# Patient Record
Sex: MALE | Race: WHITE | Employment: FULL TIME | URBAN - METROPOLITAN AREA
[De-identification: names, ages, dates, MRNs, and addresses within clinical notes are randomized per-mention and may not be internally consistent; named-entity substitution may affect disease eponyms.]

---

## 2022-10-26 ENCOUNTER — HOSPITAL ENCOUNTER (OUTPATIENT)
Dept: GENERAL RADIOLOGY | Age: 52
Discharge: HOME | End: 2022-10-26

## 2022-10-26 ENCOUNTER — HOSPITAL ENCOUNTER (OUTPATIENT)
Age: 52
Discharge: HOME | End: 2022-10-26

## 2022-10-26 DIAGNOSIS — R52 PAIN: ICD-10-CM

## 2022-11-10 ENCOUNTER — HOSPITAL ENCOUNTER (OUTPATIENT)
Dept: OCCUPATIONAL THERAPY | Age: 52
Setting detail: THERAPIES SERIES
Discharge: HOME | End: 2022-11-10
Payer: COMMERCIAL

## 2022-11-10 PROCEDURE — 97110 THERAPEUTIC EXERCISES: CPT

## 2022-11-10 PROCEDURE — 97165 OT EVAL LOW COMPLEX 30 MIN: CPT

## 2022-11-10 NOTE — PROGRESS NOTES
Occupational Therapy  ** PLEASE SIGN, DATE AND TIME CERTIFICATION BELOW AND RETURN TO [facility] OUTPATIENT REHABILITATION (FAX #: [phone]). ATTEST/CO-SIGN IF ACCESSING VIA INMiNOW[employer]less. THANK YOU.**    I certify that I have examined the patient below and determined that Physical Medicine and Rehabilitation service is necessary and that I approve the established plan of care for up to 90 days or as specifically noted. Attestation, signature or co-signature of physician indicates approval of certification requirements.    ________________________ ____________ __________  Physician Signature   Date   Time   [address]  [x] EVALUATION  [] DAILY NOTE (LAND) [] DAILY NOTE (AQUATIC ) [] PROGRESS NOTE [] DISCHARGE NOTE    [x] [address] [facility]   [] [facility] 90    [] [facility]   [] Polina Lal    Date: 11/10/2022  Patient Name:  Antoine Poole  : 1970  MRN: [id number]  CSN: [id number]    Referring Practitioner SARAH Gipson - *   Diagnosis Strain of unspecified muscle, fascia and tendon at shoulder and upper arm level, left arm, initial encounter [W92.952W]    Treatment Diagnosis L Shoulder Strain, L shoulder Pain, L shoulder weakness, L shoulder stiffness   Date of Evaluation 11/10/22      Functional Outcome Measure Used UEFI   Functional Outcome Score 43 (11/10/22)       Insurance: Primary: Payor: Javier BLOOM /  /  / ,   Secondary:    Authorization Information: PRE CERTIFICATION REQUIRED: YES, APPROVED FOR PT/OT, 1-3 TIMES A WEEK FOR 12 VISITS, FROM 22 TO 22  UNDER PRESUMPTIVE AUTHORIZATION.    IF CLAIM IS NOT APPROVED, PATIENT WILL BE RESPONSIBLE PARTY FOR PAYMENT    INSURANCE THERAPY BENEFIT:     AQUATIC THERAPY COVERED: NO  MODALITIES COVERED:  YES, NO IONT    Visit # 1, 1/10 for progress note   Visits Allowed: 12   Recertification Date: 68   Physician Follow-Up: 2022 Physician Orders: 1-3x per week x12 visits, evaluate and treat   Pertinent History: Pt is 46 y/o M who reports he injured his L shoulder at work on 10/20/22 picking up a heavy box and he reports he felt a \"pop\". Pt reports he kept working and figured it would get better, but the pain persisted so he later went to occupational health who took x-rays which revealed no acute osseous abnormalities. Pt reports they placed him on a 10 lb lifting restriction at work and referred pt to OT for eval and tx for L shoulder strain. Pt reports he had a follow up this morning at [facility] and they plan to schedule an MRI after they see how therapy goes for a few weeks. Pt reported PMH: High Blood Pressure     X-ray Report 10/26/22: \"There is no fracture or dislocation. Glenohumeral joint space is preserved. Mild degenerative changes of the acromioclavicular joint and sclerotic degenerative change at the tuberosity. \"    SUBJECTIVE: Patient is pleasant and cooperative. Social/Functional History:  Medications and Allergies have been reviewed and are listed on the [address] lives with family  Task Prior Level of Function  (current level of function addressed below)   ADLs  Independent   Ambulation Independent   Transfers Independent   Hobbies Gardening, fishing   Driving Active    Work [email or web address] Occupation: Ollies, unoad trucks, stock shelves, carrying carpet     OBJECTIVE:  Hand Dominance left handed   Palpation No increased tenderness to palpation   Observation    Posture Forward, sloping shoulder posture   Edema    Special Tests        ADL's Pt reports some difficulty with dressing, bathing with L UE, is not lifting laundry baskets or cleaning overhead at home. Pt is limited in work tasks to 10 lb lifting restriction. Bed Mobility     Transfers    Balance        Sensation Left Upper Extremity: Intact.    Coordination WNL           LEFT UPPER EXTREMITY  RANGE OF MOTION AROM PROM COMMENTS         Shoulder Flexion 89 105 Pain denoted   Shoulder Extension 39     Shoulder Abduction 83 80    Shoulder Adduction      Shoulder External Rotation 28 35 Pain with PROM   Shoulder Internal Rotation Thumb to hip     Should[facility]  []      Elbow Flexion      Elbow Extension      Forearm Pronation      Forearm Supination      Elbow Range of Motion is [facility]  [x]      Wrist Flexion      Wrist Extension      Wrist Radial Deviation      Wrist Ulnar Deviation      Wrist Range of Motion is [facility]  [x]   If no measurement is recorded, no formal assessment was completed for that motion. Strength not assessed this date due to significant pain and limitations in ROM; TBA in the future when appropriate    TREATMENT   Precautions: general precautions    Pain: 4-5/10 aching pain at rest, 8-9/10 with ROM     X in shaded column indicates Activity Completed Today   Modalities Parameters/  Location  Notes/Comments                     Manual Therapy Time/  Technique  Notes/Comments                     Exercises   Sets/  Sec Reps  Notes/Comments   Supine dowel chest press AAROM 1 5 X    Supine crossbody stretch  10 sec 3 X    Supine dowel ER 10 sec 3 X    Table slides for flexion and abduction 1 10 X    Scap pinches   X                  Activities Time    Notes/Comments   Kinesiotape- Two I-strips applied for correction of forward sloping shoulder posture  X                  Specific Interventions Next Treatment: MHP, STM, joint mobs, ROM    Activity/Treatment Tolerance:  []  Patient tolerated treatment well  []  Patient limited by fatigue  [x]  Patient limited by pain   []  Patient limited by other medical complications  []  Other:     Assessment: Patient referred to therapy for L shoulder strain. Pt demonstrates increased shoulder pain, decreased ROM, and decreased strength which impairs his ability to complete ADL, IADL, and work tasks independently and without difficulty.  Pt would benefit from skilled occupational therapy services 2x per week for ~8 weeks to address these deficits and assist patient with return to work without restrictions. Areas for Improvement: impaired ROM, impaired strength, and pain  Prognosis: good    GOALS:  Patient Goal: decrease pain, increase ROM of shoulder, return to work without restrictions    Short Term Goals:  Time Frame: 4 weeks  * Patient will increase L shoulder flexion to 110, abduction to 100, IR to 1 inch below waistline, ER to 45 for increased independence with dressing tasks. *Patient will report pain with activity no higher than 3/10 to increase ease and independence with ADL and IADL tasks. *Patient will demonstrate independence with HEP in order to increase flexibility and ability to reach overhead for IADL and work tasks. Long Term Goals:  Time Frame: 8 weeks  *  Patient will report sleeping on the L side without waking due to pain. *  Pt. will be able to don shirt and wash back using L UE without pain in shoulder  *  Patient will improve UEFS to at least 60    Patient Education:   [x]  HEP/Education Completed: Plan of Care, Goals, HEP  [facility] Access Code for HEP:   Crossbody stretch, Supine dowel ER, Supine dowel chest press, scap pinches, table slides for flexion and abduction  []  No new Education completed  []  Reviewed Prior HEP      [x]  Patient verbalized and/or demonstrated understanding of education provided. []  Patient unable to verbalize and/or demonstrate understanding of education provided. Will continue education. [x]  Barriers to learning: none    PLAN:  Treatment Recommendations: Strengthening, Range of Motion, Manual Therapy - Soft Tissue Mobilization, Manual Therapy - Joint Manipulation, Pain Management, Home Exercise Program, Patient Education, Safety Education and Training, and Self-Care Education and Training    [x]  Plan of care initiated.   Plan to see patient 2 times per week for 8 weeks to address the treatment planned outlined above.   []  Continue with current plan of care  []  Modify plan of care as follows:    []  Hold pending physician visit  []  Discharge    Time In 1601   Time Out 1644   Timed Code Minutes: 15 min   Total Treatment Time: 43 min     CPT CODE TIME-IN TIME-OUT TOTAL TIME   Low Comp Eval-[id number] 1601 1629 28 min   There Ex 1629 1644 15 min               TOTAL SESSION   43 min           Electronically Signed by: Anne Maddox, [facility], OTR/L #[id number]

## 2022-11-15 ENCOUNTER — HOSPITAL ENCOUNTER (OUTPATIENT)
Dept: OCCUPATIONAL THERAPY | Age: 52
Setting detail: THERAPIES SERIES
Discharge: HOME | End: 2022-11-15
Payer: COMMERCIAL

## 2022-11-15 PROCEDURE — 97110 THERAPEUTIC EXERCISES: CPT

## 2022-11-15 PROCEDURE — 97140 MANUAL THERAPY 1/> REGIONS: CPT

## 2022-11-15 NOTE — PROGRESS NOTES
[address]  [] EVALUATION  [x] DAILY NOTE (LAND) [] DAILY NOTE (AQUATIC ) [] PROGRESS NOTE [] DISCHARGE NOTE    [x] OUTPATIENT REHABILITATION [facility]   [] [place] [address]    [] [facility]   [] Lluvia Gallegos    Date: 11/15/2022  Patient Name:  Jose M Zamudio  : 1970  MRN: [id number]  CSN: [id number]    Referring Practitioner SARAH Wick - *   Diagnosis Strain of unspecified muscle, fascia and tendon at shoulder and upper arm level, left arm, initial encounter [P05.256O]    Treatment Diagnosis L Shoulder Strain, L shoulder Pain, L shoulder weakness, L shoulder stiffness   Date of Evaluation 11/10/22      Functional Outcome Measure Used UEFI   Functional Outcome Score 43 (11/10/22)       Insurance: Primary: Payor: Emy Osman SHEILA /  /  / ,   Secondary:    Authorization Information: PRE CERTIFICATION REQUIRED: YES, APPROVED FOR PT/OT, 1-3 TIMES A WEEK FOR 12 VISITS, FROM 22 TO 22  UNDER PRESUMPTIVE AUTHORIZATION. IF CLAIM IS NOT APPROVED, PATIENT WILL BE RESPONSIBLE PARTY FOR PAYMENT    INSURANCE THERAPY BENEFIT:     AQUATIC THERAPY COVERED: NO  MODALITIES COVERED:  YES, NO IONT    Visit # 2, 2/10 for progress note   Visits Allowed: 12   Recertification Date: 3/7/90   Physician Follow-Up: 2022   Physician Orders: 1-3x per week x12 visits, evaluate and treat   Pertinent History: Pt is 46 y/o M who reports he injured his L shoulder at work on 10/20/22 picking up a heavy box and he reports he felt a \"pop\". Pt reports he kept working and figured it would get better, but the pain persisted so he later went to occupational health who took x-rays which revealed no acute osseous abnormalities. Pt reports they placed him on a 10 lb lifting restriction at work and referred pt to OT for eval and tx for L shoulder strain.  Pt reports he had a follow up this morning at [facility] and they plan to schedule an MRI after they see how therapy goes for a few weeks. Pt reported PMH: High Blood Pressure     X-ray Report 10/26/22: \"There is no fracture or dislocation. Glenohumeral joint space is preserved. Mild degenerative changes of the acromioclavicular joint and sclerotic degenerative change at the tuberosity. \"    SUBJECTIVE: Patient reports continued soreness but compliance with ex's. Pt reports some relief with the tape applied at last visit. Pt reports he tweaked his shoulder while using a pallet james at work on Saturday.     TREATMENT   Precautions: general precautions    Pain: 7/10     X in shaded column indicates Activity Completed Today   Modalities Parameters/  Location  Notes/Comments   MHP with dowel ER, upper trap stretch, table slides  X                Manual Therapy Time/  Technique  Notes/Comments   Manual STM to L upper trap and posterior shoulder  X                Exercises   Sets/  Sec Reps  Notes/Comments   Supine dowel chest press AAROM, flexion 1 10 X Discomfort with chest press, tolerated adding flexion well this date   Supine crossbody stretch  15 sec 5 X Completed manually by therapist this date   Supine dowel ER 10 sec 5 X    Table slides for flexion and abduction 1 10 X Flexion only this date with MHP   Scap pinches 1 10 X    Pulleys for shoulder flexion 1 3 min X    Doorway stretch for biceps/pec minor 15 sec 3 X           Activities Time    Notes/Comments   Kinesiotape- Two I-strips applied for correction of forward sloping shoulder posture  X Re-applied as pt reports relief with tape                 Specific Interventions Next Treatment: MHP, STM, joint mobs, ROM    Activity/Treatment Tolerance:  []  Patient tolerated treatment well  []  Patient limited by fatigue  [x]  Patient limited by pain   []  Patient limited by other medical complications  []  Other:     Assessment: Patient tolerated therapy well this date with noted improvements in shoulder flexion AAROM, pain persists but pt is progressing toward goals. Areas for Improvement: impaired ROM, impaired strength, and pain  Prognosis: good    GOALS:  Patient Goal: decrease pain, increase ROM of shoulder, return to work without restrictions    Short Term Goals:  Time Frame: 4 weeks  * Patient will increase L shoulder flexion to 110, abduction to 100, IR to 1 inch below waistline, ER to 45 for increased independence with dressing tasks. *Patient will report pain with activity no higher than 3/10 to increase ease and independence with ADL and IADL tasks. *Patient will demonstrate independence with HEP in order to increase flexibility and ability to reach overhead for IADL and work tasks. Long Term Goals:  Time Frame: 8 weeks  *  Patient will report sleeping on the L side without waking due to pain. *  Pt. will be able to don shirt and wash back using L UE without pain in shoulder  *  Patient will improve UEFS to at least 60    Patient Education:   [x]  HEP/Education Completed: Plan of Care, Goals, HEP  [employer] Access Code for HEP:   Crossbody stretch, Supine dowel ER, Supine dowel chest press, scap pinches, table slides for flexion and abduction  Shoulder flexion dowel AAROM in supine, doorway stretch for biceps/pec minor  []  No new Education completed  []  Reviewed Prior HEP      [x]  Patient verbalized and/or demonstrated understanding of education provided. []  Patient unable to verbalize and/or demonstrate understanding of education provided. Will continue education. [x]  Barriers to learning: none    PLAN:  Treatment Recommendations: Strengthening, Range of Motion, Manual Therapy - Soft Tissue Mobilization, Manual Therapy - Joint Manipulation, Pain Management, Home Exercise Program, Patient Education, Safety Education and Training, and Self-Care Education and Training    []  Plan of care initiated. Plan to see patient 2 times per week for 8 weeks to address the treatment planned outlined above.   [x]  Continue with current plan of care  []  Modify plan of care as follows:    []  Hold pending physician visit  []  Discharge    Time In 0935   Time Out 1015   Timed Code Minutes: 40 min   Total Treatment Time: 40 min     CPT CODE TIME-IN TIME-OUT TOTAL TIME   Manual-85362 1000 1015 15 min   Ther Ex-50361 0935 1000 25 min               TOTAL SESSION   40 min           Electronically Signed by: Lenny Min, [address], OTR/L #[id number]

## 2022-11-17 ENCOUNTER — HOSPITAL ENCOUNTER (OUTPATIENT)
Dept: OCCUPATIONAL THERAPY | Age: 52
Setting detail: THERAPIES SERIES
Discharge: HOME | End: 2022-11-17
Payer: COMMERCIAL

## 2022-11-17 PROCEDURE — 97110 THERAPEUTIC EXERCISES: CPT

## 2022-11-17 PROCEDURE — 97140 MANUAL THERAPY 1/> REGIONS: CPT

## 2022-11-17 NOTE — PROGRESS NOTES
[address]  [] EVALUATION  [x] DAILY NOTE (LAND) [] DAILY NOTE (AQUATIC ) [] PROGRESS NOTE [] DISCHARGE NOTE    [x] OUTPATIENT REHABILITATION [facility]   [] [place] [address]    [] [facility]   [] [facility]    Date: 2022  Patient Name:  Alcide Lanes  : 1970  MRN: [id number]  CSN: [id number]    Referring Practitioner SARAH Rodriguez - *   Diagnosis Strain of unspecified muscle, fascia and tendon at shoulder and upper arm level, left arm, initial encounter [I40.243F]    Treatment Diagnosis L Shoulder Strain, L shoulder Pain, L shoulder weakness, L shoulder stiffness   Date of Evaluation 11/10/22      Functional Outcome Measure Used UEFI   Functional Outcome Score 43 (11/10/22)       Insurance: Primary: Payor: Dami Michelle [facility] /  /  / ,   Secondary:    Authorization Information: PRE CERTIFICATION REQUIRED: YES, APPROVED FOR PT/OT, 1-3 TIMES A WEEK FOR 12 VISITS, FROM 22 TO 22  UNDER PRESUMPTIVE AUTHORIZATION. IF CLAIM IS NOT APPROVED, PATIENT WILL BE RESPONSIBLE PARTY FOR PAYMENT    INSURANCE THERAPY BENEFIT:     AQUATIC THERAPY COVERED: NO  MODALITIES COVERED:  YES, NO IONT    Visit # 3, 3/10 for progress note   Visits Allowed: 12   Recertification Date: 11   Physician Follow-Up: 2022   Physician Orders: 1-3x per week x12 visits, evaluate and treat   Pertinent History: Pt is 44 y/o M who reports he injured his L shoulder at work on 10/20/22 picking up a heavy box and he reports he felt a \"pop\". Pt reports he kept working and figured it would get better, but the pain persisted so he later went to occupational health who took x-rays which revealed no acute osseous abnormalities. Pt reports they placed him on a 10 lb lifting restriction at work and referred pt to OT for eval and tx for L shoulder strain.  Pt reports he had a follow up this morning at [facility] and they plan to schedule an MRI after they see how therapy goes for a few weeks. Pt reported PMH: High Blood Pressure     X-ray Report 10/26/22: \"There is no fracture or dislocation. Glenohumeral joint space is preserved. Mild degenerative changes of the acromioclavicular joint and sclerotic degenerative change at the tuberosity. \"    SUBJECTIVE: Patient presents to therapy reporting, \"My shoulder is sore today. I'm not sure if it's the weather or what. It hurts on the side of my shoulder more today than on top\"    TREATMENT   Precautions: general precautions    Pain: 7.5/10 lateral shoulder     X in shaded column indicates Activity Completed Today   Modalities Parameters/  Location  Notes/Comments   MHP with dowel ER, upper trap stretch, scap pinches x15 min  X                Manual Therapy Time/  Technique  Notes/Comments   Manual STM/IASTM to L upper trap, deltoid and posterior shoulder  X    GH mobs  x    Gentle supine PROM in all planes to tolerance  x    Exercises   Sets/  Sec Reps  Notes/Comments   Supine dowel chest press AAROM, flexion 1 10 X Discomfort only on rep 10 of chest press this date-improved tolerance   Supine crossbody stretch  15 sec 5 X Completed manually by therapist this date   Supine dowel ER 10 sec 5 X    Table slides for flexion 1 10 X with MHP   Scap pinches 1 10 X    Pulleys for shoulder flexion 1 3 min X    Doorway stretch for biceps/pec minor 15 sec 3     Supine pec minor/biceps stretch over towel roll for thoracic extension 20 sec 3 x    Sidelying sleeper    Trial at next visit if appropriate?           Activities Time    Notes/Comments   Kinesiotape- Two I-strips applied for correction of forward sloping shoulder posture   Tape remains intact from last visit 11/17/22                 Specific Interventions Next Treatment: MHP, STM, joint mobs, ROM    Activity/Treatment Tolerance:  []  Patient tolerated treatment well  []  Patient limited by fatigue  [x]  Patient limited by pain   []  Patient limited by other medical complications  []  Other:     Assessment: Patient tolerated therapy well this date with noted improvements in AROM noted, pain persists per pt report. Areas for Improvement: impaired ROM, impaired strength, and pain  Prognosis: good    GOALS:  Patient Goal: decrease pain, increase ROM of shoulder, return to work without restrictions    Short Term Goals:  Time Frame: 4 weeks  * Patient will increase L shoulder flexion to 110, abduction to 100, IR to 1 inch below waistline, ER to 45 for increased independence with dressing tasks. *Patient will report pain with activity no higher than 3/10 to increase ease and independence with ADL and IADL tasks. *Patient will demonstrate independence with HEP in order to increase flexibility and ability to reach overhead for IADL and work tasks. Long Term Goals:  Time Frame: 8 weeks  *  Patient will report sleeping on the L side without waking due to pain. *  Pt. will be able to don shirt and wash back using L UE without pain in shoulder  *  Patient will improve UEFS to at least 60    Patient Education:   []  HEP/Education Completed: Plan of Care, Goals, HEP  [facility] Access Code for HEP:   Crossbody stretch, Supine dowel ER, Supine dowel chest press, scap pinches, table slides for flexion and abduction  Shoulder flexion dowel AAROM in supine, doorway stretch for biceps/pec minor  []  No new Education completed  [x]  Reviewed Prior HEP      [x]  Patient verbalized and/or demonstrated understanding of education provided. []  Patient unable to verbalize and/or demonstrate understanding of education provided. Will continue education. [x]  Barriers to learning: none    PLAN:  Treatment Recommendations: Strengthening, Range of Motion, Manual Therapy - Soft Tissue Mobilization, Manual Therapy - Joint Manipulation, Pain Management, Home Exercise Program, Patient Education, Safety Education and Training, and Self-Care Education and Training    []  Plan of care initiated. Plan to see patient 2 times per week for 8 weeks to address the treatment planned outlined above.   [x]  Continue with current plan of care  []  Modify plan of care as follows:    []  Hold pending physician visit  []  Discharge    Time In 0759   Time Out 0842   Timed Code Minutes: 43 min   Total Treatment Time: 43 min     CPT CODE TIME-IN TIME-OUT TOTAL TIME   Manual-[id number] 0827 0842 15 min   Ther Ex-41053 0759 0827 28 min               TOTAL SESSION   43 min           Electronically Signed by: Kristin Rinne, BRAWINKL, OTR/L OQ#[id number]

## 2022-11-22 ENCOUNTER — HOSPITAL ENCOUNTER (OUTPATIENT)
Dept: OCCUPATIONAL THERAPY | Age: 52
Setting detail: THERAPIES SERIES
Discharge: HOME | End: 2022-11-22
Payer: COMMERCIAL

## 2022-11-22 PROCEDURE — 97110 THERAPEUTIC EXERCISES: CPT

## 2022-11-22 PROCEDURE — 97140 MANUAL THERAPY 1/> REGIONS: CPT

## 2022-11-22 NOTE — PROGRESS NOTES
[address]  [] EVALUATION  [x] DAILY NOTE (LAND) [] DAILY NOTE (AQUATIC ) [] PROGRESS NOTE [] DISCHARGE NOTE    [x] OUTPATIENT REHABILITATION [facility]   [] [place] [address]    [] [facility]   [] Gerald Medley    Date: 2022  Patient Name:  Altagracia Zambrano  : 1970  MRN: [id number]  CSN: [id number]    Referring Practitioner SARAH Subramanian - *   Diagnosis Strain of unspecified muscle, fascia and tendon at shoulder and upper arm level, left arm, initial encounter [E59.002V]    Treatment Diagnosis L Shoulder Strain, L shoulder Pain, L shoulder weakness, L shoulder stiffness   Date of Evaluation 11/10/22      Functional Outcome Measure Used UEFI   Functional Outcome Score 43 (11/10/22)       Insurance: Primary: Payor: [employer] /  /  / ,   Secondary:    Authorization Information: PRE CERTIFICATION REQUIRED: YES, APPROVED FOR PT/OT, 1-3 TIMES A WEEK FOR 12 VISITS, FROM 22 TO 22  UNDER PRESUMPTIVE AUTHORIZATION. IF CLAIM IS NOT APPROVED, PATIENT WILL BE RESPONSIBLE PARTY FOR PAYMENT    INSURANCE THERAPY BENEFIT:     AQUATIC THERAPY COVERED: NO  MODALITIES COVERED:  YES, NO IONT    Visit # 4, 4/10 for progress note   Visits Allowed: 12   Recertification Date: 52   Physician Follow-Up: 2022   Physician Orders: 1-3x per week x12 visits, evaluate and treat   Pertinent History: Pt is 46 y/o M who reports he injured his L shoulder at work on 10/20/22 picking up a heavy box and he reports he felt a \"pop\". Pt reports he kept working and figured it would get better, but the pain persisted so he later went to occupational health who took x-rays which revealed no acute osseous abnormalities. Pt reports they placed him on a 10 lb lifting restriction at work and referred pt to OT for eval and tx for L shoulder strain.  Pt reports he had a follow up this morning at [facility] and they plan to schedule an MRI after they see how therapy goes for a few weeks. Pt reported PMH: High Blood Pressure     X-ray Report 10/26/22: \"There is no fracture or dislocation. Glenohumeral joint space is preserved. Mild degenerative changes of the acromioclavicular joint and sclerotic degenerative change at the tuberosity. \"    SUBJECTIVE: Patient reports, \"My arm is a little sore, I was having some spasms down my arm and dropping things that I was holding in my left hand. \" Pt reports reduced resting pain level this date (5 compared to 7.5 at last visit).     TREATMENT   Precautions: general precautions    Pain: 5/10 lateral shoulder,      X in shaded column indicates Activity Completed Today   Modalities Parameters/  Location  Notes/Comments   MHP with dowel ER, upper trap stretch, scap pinches x15 min  X                Manual Therapy Time/  Technique  Notes/Comments   Manual STM/IASTM to L upper trap, deltoid and posterior shoulder  X    GH mobs  x    Gentle supine PROM in all planes to , manual subscap stretch  x    Exercises   Sets/  Sec Reps  Notes/Comments   Supine dowel chest press AAROM, flexion 1 10 X No discomfort reported this date with chest press=improvement noted   Supine crossbody stretch  15 sec 5     Supine dowel ER 10 sec 5 X ER dowel stretch with MHP this date   Table slides for flexion 1 10     Scap pinches 1 10 X With MHP   Pulleys for shoulder flexion 1 3 min X    Doorway stretch for biceps/pec minor 15 sec 3     Supine pec minor/biceps stretch over towel roll for thoracic extension 20 sec 3 x Given for HEP   Sidelying sleeper   X Initiated this date, tolerated well, added to HEP          Activities Time    Notes/Comments   Kinesiotape- Two I-strips applied for correction of forward sloping shoulder posture  X                  Specific Interventions Next Treatment: MHP, STM, joint mobs, ROM    Activity/Treatment Tolerance:  []  Patient tolerated treatment well  []  Patient limited by fatigue  [x]  Patient limited by pain   []  Patient limited by other medical complications  []  Other:     Assessment: Patient progressing toward goals, reduced resting pain compared to previous visits this date. Tolerated session well. Areas for Improvement: impaired ROM, impaired strength, and pain  Prognosis: good    GOALS:  Patient Goal: decrease pain, increase ROM of shoulder, return to work without restrictions    Short Term Goals:  Time Frame: 4 weeks  * Patient will increase L shoulder flexion to 110, abduction to 100, IR to 1 inch below waistline, ER to 45 for increased independence with dressing tasks. *Patient will report pain with activity no higher than 3/10 to increase ease and independence with ADL and IADL tasks. *Patient will demonstrate independence with HEP in order to increase flexibility and ability to reach overhead for IADL and work tasks. Long Term Goals:  Time Frame: 8 weeks  *  Patient will report sleeping on the L side without waking due to pain. *  Pt. will be able to don shirt and wash back using L UE without pain in shoulder  *  Patient will improve UEFS to at least 60    Patient Education:   [x]  HEP/Education Completed: Plan of Care, Goals, HEP  [facility]:   Crossbody stretch, Supine dowel ER, Supine dowel chest press, scap pinches, table slides for flexion and abduction  Shoulder flexion dowel AAROM in supine, doorway stretch for biceps/pec minor  Supine thoracic ext over towel roll, sidelying sleeper  []  No new Education completed  [x]  Reviewed Prior HEP      [x]  Patient verbalized and/or demonstrated understanding of education provided. []  Patient unable to verbalize and/or demonstrate understanding of education provided. Will continue education.   [x]  Barriers to learning: none    PLAN:  Treatment Recommendations: Strengthening, Range of Motion, Manual Therapy - Soft Tissue Mobilization, Manual Therapy - Joint Manipulation, Pain Management, Home Exercise Program, Patient Education, Safety Education and Training, and Self-Care Education and Training    []  Plan of care initiated. Plan to see patient 2 times per week for 8 weeks to address the treatment planned outlined above.   [x]  Continue with current plan of care  []  Modify plan of care as follows:    []  Hold pending physician visit  []  Discharge    Time In 0828   Time Out 0916   Timed Code Minutes: 48 min   Total Treatment Time: 48 min     CPT CODE TIME-IN TIME-OUT TOTAL TIME   Manual-[id number] 0827 0842 15 min   Ther Ex-62652 0759 0827 33 min               TOTAL SESSION   48 min           Electronically Signed by: MOOSE Kaye, OTR/L OU#[id number]

## 2022-11-30 ENCOUNTER — HOSPITAL ENCOUNTER (OUTPATIENT)
Dept: OCCUPATIONAL THERAPY | Age: 52
Setting detail: THERAPIES SERIES
Discharge: HOME | End: 2022-11-30
Payer: COMMERCIAL

## 2022-11-30 PROCEDURE — 97110 THERAPEUTIC EXERCISES: CPT

## 2022-11-30 PROCEDURE — 97140 MANUAL THERAPY 1/> REGIONS: CPT

## 2022-11-30 NOTE — PROGRESS NOTES
[address]  [] EVALUATION  [x] DAILY NOTE (LAND) [] DAILY NOTE (AQUATIC ) [] PROGRESS NOTE [] DISCHARGE NOTE    [x] OUTPATIENT REHABILITATION [facility]   [] [place] [address]    [] [facility]   [facility]    Date: 2022  Patient Name:  Alcide Lanes  : 1970  MRN: [id number]  CSN: [id number]    Referring Practitioner SARAH Rodriguez - *   Diagnosis Strain of unspecified muscle, fascia and tendon at shoulder and upper arm level, left arm, initial encounter [U21.922Q]    Treatment Diagnosis L Shoulder Strain, L shoulder Pain, L shoulder weakness, L shoulder stiffness   Date of Evaluation 11/10/22      Functional Outcome Measure Used UEFI   Functional Outcome Score 43 (11/10/22)       Insurance: Primary: Payor: Dami Michelle [facility] /  /  / ,   Secondary:    Authorization Information: PRE CERTIFICATION REQUIRED: YES, APPROVED FOR PT/OT, 1-3 TIMES A WEEK FOR 12 VISITS, FROM 22 TO 22  UNDER PRESUMPTIVE AUTHORIZATION. IF CLAIM IS NOT APPROVED, PATIENT WILL BE RESPONSIBLE PARTY FOR PAYMENT    INSURANCE THERAPY BENEFIT:     AQUATIC THERAPY COVERED: NO  MODALITIES COVERED:  YES, NO IONT    Visit # 5, /10 for progress note   Visits Allowed: 12   Recertification Date: 52   Physician Follow-Up: 2022   Physician Orders: 1-3x per week x12 visits, evaluate and treat   Pertinent History: Pt is 46 y/o M who reports he injured his L shoulder at work on 10/20/22 picking up a heavy box and he reports he felt a \"pop\". Pt reports he kept working and figured it would get better, but the pain persisted so he later went to occupational health who took x-rays which revealed no acute osseous abnormalities. Pt reports they placed him on a 10 lb lifting restriction at work and referred pt to OT for eval and tx for L shoulder strain.  Pt reports he had a follow up this morning at [facility] and they plan to schedule an MRI after they see how therapy goes for a few weeks. Pt reported PMH: High Blood Pressure     X-ray Report 10/26/22: \"There is no fracture or dislocation. Glenohumeral joint space is preserved. Mild degenerative changes of the acromioclavicular joint and sclerotic degenerative change at the tuberosity. \"    SUBJECTIVE: Patient reports slow improvements, still very sharp with certain movements. TREATMENT   Precautions: general precautions    Pain: 4/10 lateral shoulder      X in shaded column indicates Activity Completed Today   Modalities Parameters/  Location  Notes/Comments   MHP with dowel ER, upper trap stretch, scap pinches x10 min  X Focus mostly on ER this date. Tight, capsular end feel.                Manual Therapy Time/  Technique  Notes/Comments   Manual STM/IASTM to L upper trap, deltoid and posterior shoulder      GH mobs  X Especially posterior   Gentle supine PROM in all planes to , manual subscap stretch  X Slow progressive stretching in supine and sidelying as well as scapular mobilizations   Exercises   Sets/  Sec Reps  Notes/Comments   Supine dowel chest press AAROM, flexion 1 10 X    Supine crossbody stretch  15 sec 5 X    Supine dowel ER 10 sec 5 X ER dowel stretch with MHP this date   Table slides for flexion 1 10     Scap pinches 1 10  With MHP   Pulleys for shoulder flexion 1 3 min X    Doorway stretch for biceps/pec minor 15 sec 3     Supine pec minor/biceps stretch over towel roll for thoracic extension 20 sec 3 X Given for HEP   Sidelying sleeper   X           Activities Time    Notes/Comments   Kinesiotape- Two I-strips applied for correction of forward sloping shoulder posture  X Modified this date to trial deltoid inhibition, upper trap inhibition, mechanical correction anterior to posterior shoulder                 Specific Interventions Next Treatment: MHP, STM, joint mobs, ROM    Activity/Treatment Tolerance:  []  Patient tolerated treatment well  []  Patient limited by fatigue  [x]  Patient limited by pain   []  Patient limited by other medical complications  []  Other:     Assessment: Patient progressing toward goals, improved motion noted from evaluation with tight capsular end feel, especially ER and abduction. Areas for Improvement: impaired ROM, impaired strength, and pain  Prognosis: good    GOALS:  Patient Goal: decrease pain, increase ROM of shoulder, return to work without restrictions    Short Term Goals:  Time Frame: 4 weeks  * Patient will increase L shoulder flexion to 110, abduction to 100, IR to 1 inch below waistline, ER to 45 for increased independence with dressing tasks. *Patient will report pain with activity no higher than 3/10 to increase ease and independence with ADL and IADL tasks. *Patient will demonstrate independence with HEP in order to increase flexibility and ability to reach overhead for IADL and work tasks. Long Term Goals:  Time Frame: 8 weeks  *  Patient will report sleeping on the L side without waking due to pain. *  Pt. will be able to don shirt and wash back using L UE without pain in shoulder  *  Patient will improve UEFS to at least 60    Patient Education:   [x]  HEP/Education Completed: Plan of Care, Goals, HEP  [facility]:   Crossbody stretch, Supine dowel ER, Supine dowel chest press, scap pinches, table slides for flexion and abduction  Shoulder flexion dowel AAROM in supine, doorway stretch for biceps/pec minor  Supine thoracic ext over towel roll, sidelying sleeper  []  No new Education completed  [x]  Reviewed Prior HEP      [x]  Patient verbalized and/or demonstrated understanding of education provided. []  Patient unable to verbalize and/or demonstrate understanding of education provided. Will continue education.   [x]  Barriers to learning: none    PLAN:  Treatment Recommendations: Strengthening, Range of Motion, Manual Therapy - Soft Tissue Mobilization, Manual Therapy - Joint Manipulation, Pain Management, Home Exercise Program, Patient Education, Safety Education and Training, and Self-Care Education and Training    []  Plan of care initiated. Plan to see patient 2 times per week for 8 weeks to address the treatment planned outlined above.   [x]  Continue with current plan of care  []  Modify plan of care as follows:    []  Hold pending physician visit  []  Discharge    Time In 0845   Time Out 0930   Timed Code Minutes: 45 min   Total Treatment Time: 45 min     CPT CODE TIME-IN TIME-OUT TOTAL TIME   Manual-[id number] 0845 0900 15 min   Ther Ex-[id number] 0900 0930 30 min               TOTAL SESSION 0845 0930 45 min           Electronically Signed by: Cyrus VÁZQUEZ/TITO, CHT #[id number]

## 2022-12-01 ENCOUNTER — APPOINTMENT (OUTPATIENT)
Dept: OCCUPATIONAL THERAPY | Age: 52
End: 2022-12-01
Payer: COMMERCIAL

## 2022-12-02 ENCOUNTER — APPOINTMENT (OUTPATIENT)
Dept: OCCUPATIONAL THERAPY | Age: 52
End: 2022-12-02
Payer: COMMERCIAL

## 2022-12-06 ENCOUNTER — HOSPITAL ENCOUNTER (OUTPATIENT)
Dept: OCCUPATIONAL THERAPY | Age: 52
Setting detail: THERAPIES SERIES
Discharge: HOME | End: 2022-12-06
Payer: COMMERCIAL

## 2022-12-06 PROCEDURE — 97140 MANUAL THERAPY 1/> REGIONS: CPT

## 2022-12-06 PROCEDURE — 97110 THERAPEUTIC EXERCISES: CPT

## 2022-12-06 NOTE — PROGRESS NOTES
[address]  [] EVALUATION  [x] DAILY NOTE (LAND) [] DAILY NOTE (AQUATIC ) [] PROGRESS NOTE [] DISCHARGE NOTE    [x] OUTPATIENT REHABILITATION [facility]   [] [place] [address]    [] [facility]   [] Anna Culp    Date: 2022  Patient Name:  Jolene Hart  : 1970  MRN: [id number]  CSN: [id number]    Referring Practitioner SARAH Hart - *   Diagnosis Strain of unspecified muscle, fascia and tendon at shoulder and upper arm level, left arm, initial encounter [Y93.454N]    Treatment Diagnosis L Shoulder Strain, L shoulder Pain, L shoulder weakness, L shoulder stiffness   Date of Evaluation 11/10/22      Functional Outcome Measure Used UEFI   Functional Outcome Score 43 (11/10/22)       Insurance: Primary: Payor: Salazar Kamara O /  /  / ,   Secondary:    Authorization Information: PRE CERTIFICATION REQUIRED: YES, APPROVED FOR PT/OT, 1-3 TIMES A WEEK FOR 12 VISITS, FROM 22 TO 22  UNDER PRESUMPTIVE AUTHORIZATION. IF CLAIM IS NOT APPROVED, PATIENT WILL BE RESPONSIBLE PARTY FOR PAYMENT    INSURANCE THERAPY BENEFIT:     AQUATIC THERAPY COVERED: NO  MODALITIES COVERED:  YES, NO IONT    Visit # 6, 6/10 for progress note   Visits Allowed: 12   Recertification Date:    Physician Follow-Up: 2022   Physician Orders: 1-3x per week x12 visits, evaluate and treat   Pertinent History: Pt is 44 y/o M who reports he injured his L shoulder at work on 10/20/22 picking up a heavy box and he reports he felt a \"pop\". Pt reports he kept working and figured it would get better, but the pain persisted so he later went to occupational health who took x-rays which revealed no acute osseous abnormalities. Pt reports they placed him on a 10 lb lifting restriction at work and referred pt to OT for eval and tx for L shoulder strain.  Pt reports he had a follow up this morning at [facility] and they plan to schedule an MRI after they see how therapy goes for a few weeks. Pt reported PMH: High Blood Pressure     X-ray Report 10/26/22: \"There is no fracture or dislocation. Glenohumeral joint space is preserved. Mild degenerative changes of the acromioclavicular joint and sclerotic degenerative change at the tuberosity. \"    SUBJECTIVE: Patient presents to therapy reporting his shoulder is feeling \"pretty good\" today. Work is still very busy which can flare his shoulder up. Pt reports, \"My arm is definitely better than it was, but reaching out to the side to grab something can still cause sharp pains at times. The doctor said the MRI showed that the bursa was inflamed and he also told me I have arthritis in there. He told me they are trying to get injections approved\"    TREATMENT   Precautions: general precautions    Pain: 2/10 lateral shoulder      X in shaded column indicates Activity Completed Today   Modalities Parameters/  Location  Notes/Comments   MHP with dowel ER, scap pinches x10 min  X Focus mostly on ER this date. Tight, capsular end feel.                Manual Therapy Time/  Technique  Notes/Comments   Manual STM/IASTM to L upper trap, deltoid and posterior shoulder      GH mobs  X Especially posterior   Gentle supine PROM in all planes to , manual subscap stretch  X Slow progressive stretching in supine and sidelying as well as scapular mobilizations   Exercises   Sets/  Sec Reps  Notes/Comments   Supine dowel chest press AAROM, flexion 1 10 X    Supine crossbody stretch  15 sec 5 X    Supine dowel ER 10 sec 5 X ER dowel stretch with MHP this date   Table slides for flexion 1 10     Scap pinches 1 10  With MHP   Pulleys for shoulder flexion 1 3 min X    Doorway stretch for biceps/pec minor 15 sec 3     Supine pec minor/biceps stretch over towel roll for thoracic extension 20 sec 3 X Trialed abduction AROM over towel roll with thumb up, tightness reported x5 reps then stopped due to pain   Sidelying sleeper 15 3 X Activities Time    Notes/Comments   Kinesiotape- Deltoid inhibition, upper trap inhibition, mechanical correction ant to post shoulder   X Modified this date to trial deltoid inhibition, upper trap inhibition, mechanical correction anterior to posterior shoulder                 Specific Interventions Next Treatment: MHP, STM, joint mobs, ROM    Activity/Treatment Tolerance:  []  Patient tolerated treatment well  []  Patient limited by fatigue  [x]  Patient limited by pain   []  Patient limited by other medical complications  []  Other:     Assessment: Patient progressing toward goals, tolerated session well, min pain with abduction AROM in supine. Areas for Improvement: impaired ROM, impaired strength, and pain  Prognosis: good    GOALS:  Patient Goal: decrease pain, increase ROM of shoulder, return to work without restrictions    Short Term Goals:  Time Frame: 4 weeks  * Patient will increase L shoulder flexion to 110, abduction to 100, IR to 1 inch below waistline, ER to 45 for increased independence with dressing tasks. *Patient will report pain with activity no higher than 3/10 to increase ease and independence with ADL and IADL tasks. *Patient will demonstrate independence with HEP in order to increase flexibility and ability to reach overhead for IADL and work tasks. Long Term Goals:  Time Frame: 8 weeks  *  Patient will report sleeping on the L side without waking due to pain.     *  Pt. will be able to don shirt and wash back using L UE without pain in shoulder  *  Patient will improve UEFS to at least 60    Patient Education:   []  HEP/Education Completed: Plan of Care, Goals, HEP  [place] Access Code for HEP:   Crossbody stretch, Supine dowel ER, Supine dowel chest press, scap pinches, table slides for flexion and abduction  Shoulder flexion dowel AAROM in supine, doorway stretch for biceps/pec minor  Supine thoracic ext over towel roll, sidelying sleeper  []  No new Education completed  [x]  Reviewed Prior HEP      [x]  Patient verbalized and/or demonstrated understanding of education provided. []  Patient unable to verbalize and/or demonstrate understanding of education provided. Will continue education. [x]  Barriers to learning: none    PLAN:  Treatment Recommendations: Strengthening, Range of Motion, Manual Therapy - Soft Tissue Mobilization, Manual Therapy - Joint Manipulation, Pain Management, Home Exercise Program, Patient Education, Safety Education and Training, and Self-Care Education and Training    []  Plan of care initiated. Plan to see patient 2 times per week for 8 weeks to address the treatment planned outlined above.   [x]  Continue with current plan of care  []  Modify plan of care as follows:    []  Hold pending physician visit  []  Discharge    Time In 0830   Time Out 0912   Timed Code Minutes: 42 min   Total Treatment Time: 42 min     CPT CODE TIME-IN TIME-OUT TOTAL TIME   Manual-[id number] 0830 0845 15 min   Ther Ex-[id number] 0845 0912 30 min               TOTAL SESSION 0830 0912 45 min           [place], [place], OTR/L [id number]

## 2022-12-08 ENCOUNTER — HOSPITAL ENCOUNTER (OUTPATIENT)
Dept: OCCUPATIONAL THERAPY | Age: 52
Setting detail: THERAPIES SERIES
Discharge: HOME | End: 2022-12-08
Payer: COMMERCIAL

## 2022-12-08 PROCEDURE — 97140 MANUAL THERAPY 1/> REGIONS: CPT

## 2022-12-08 PROCEDURE — 97110 THERAPEUTIC EXERCISES: CPT

## 2022-12-08 NOTE — PROGRESS NOTES
[address]  [] EVALUATION  [x] DAILY NOTE (LAND) [] DAILY NOTE (AQUATIC ) [] PROGRESS NOTE [] DISCHARGE NOTE    [x] OUTPATIENT REHABILITATION [facility]   [] [place] [address]    [] [facility]   [] [facility]    Date: 2022  Patient Name:  Nara Yip  : 1970  MRN: [id number]  CSN: [id number]    Referring Practitioner SARAH Dickey - *   Diagnosis Strain of unspecified muscle, fascia and tendon at shoulder and upper arm level, left arm, initial encounter [Y81.839Q]    Treatment Diagnosis L Shoulder Strain, L shoulder Pain, L shoulder weakness, L shoulder stiffness   Date of Evaluation 11/10/22      Functional Outcome Measure Used UEFI   Functional Outcome Score 43 (11/10/22)       Insurance: Primary: Payor: Rosi BLOOM /  /  / ,   Secondary:    Authorization Information: PRE CERTIFICATION REQUIRED: YES, APPROVED FOR PT/OT, 1-3 TIMES A WEEK FOR 12 VISITS, FROM 22 TO 22  UNDER PRESUMPTIVE AUTHORIZATION. IF CLAIM IS NOT APPROVED, PATIENT WILL BE RESPONSIBLE PARTY FOR PAYMENT    INSURANCE THERAPY BENEFIT:     AQUATIC THERAPY COVERED: NO  MODALITIES COVERED:  YES, NO IONT    Visit # 7, 7/10 for progress note   Visits Allowed: 12   Recertification Date: 1/3/74   Physician Follow-Up: 2022   Physician Orders: 1-3x per week x12 visits, evaluate and treat   Pertinent History: Pt is 44 y/o M who reports he injured his L shoulder at work on 10/20/22 picking up a heavy box and he reports he felt a \"pop\". Pt reports he kept working and figured it would get better, but the pain persisted so he later went to occupational health who took x-rays which revealed no acute osseous abnormalities. Pt reports they placed him on a 10 lb lifting restriction at work and referred pt to OT for eval and tx for L shoulder strain.  Pt reports he had a follow up this morning at [facility] and they plan to schedule an MRI after they see how therapy goes for a few weeks. Pt reported PMH: High Blood Pressure     X-ray Report 10/26/22: \"There is no fracture or dislocation. Glenohumeral joint space is preserved. Mild degenerative changes of the acromioclavicular joint and sclerotic degenerative change at the tuberosity. \"    SUBJECTIVE: Patient reports his shoulder is not feeling too bad today. Reports his injection got approved and he is scheduled with [facility] tomorrow to have that done. TREATMENT   Precautions: general precautions    Pain: 2.5-3/10 lateral shoulder      X in shaded column indicates Activity Completed Today   Modalities Parameters/  Location  Notes/Comments   MHP with dowel ER x10 min  X Focus mostly on ER this date. Tight, capsular end feel.                Manual Therapy Time/  Technique  Notes/Comments   Manual STM/IASTM to L upper trap, deltoid and posterior shoulder  X Manual STM between stretches this date   GH mobs  X Especially posterior   Gentle supine PROM in all planes to , manual subscap stretch  X Slow progressive stretching in supine and sidelying as well as scapular mobilizations   Exercises   Sets/  Sec Reps  Notes/Comments   Supine dowel chest press AAROM, flexion 1 10     Supine crossbody stretch  15 sec 5 X    Supine dowel ER 10 sec 5 X ER dowel stretch with MHP this date   Table slides for flexion 1 10     Scap pinches 1 10 X    Pulleys for shoulder flexion 1 3 min X    Doorway stretch for biceps/pec minor 15 sec 3     Supine pec minor/biceps stretch over towel roll for thoracic extension 20 sec 3 X    Sidelying sleeper 15 3 X    Supine flexion AROM, Sidelying ER AROM with towel roll at side 1 10 X 5 reps ER this date, flexion   Hand behind back for IR 1 5 X           Activities Time    Notes/Comments   Kinesiotape- Deltoid inhibition, upper trap inhibition, mechanical correction ant to post shoulder    Modified this date to trial deltoid inhibition, upper trap inhibition, mechanical correction anterior to posterior shoulder                 Specific Interventions Next Treatment: MHP, STM, joint mobs, ROM    Activity/Treatment Tolerance:  []  Patient tolerated treatment well  []  Patient limited by fatigue  [x]  Patient limited by pain   []  Patient limited by other medical complications  []  Other:     Assessment: Patient progressing toward goals, tolerated session well overall other than increased pain with attempts at abduction AROM and IR behind back with dowel. Pt will receive injection tomorrow and will assess pain/tolerance to ROM at next visit  Areas for Improvement: impaired ROM, impaired strength, and pain  Prognosis: good    GOALS:  Patient Goal: decrease pain, increase ROM of shoulder, return to work without restrictions    Short Term Goals:  Time Frame: 4 weeks  * Patient will increase L shoulder flexion to 110, abduction to 100, IR to 1 inch below waistline, ER to 45 for increased independence with dressing tasks. *Patient will report pain with activity no higher than 3/10 to increase ease and independence with ADL and IADL tasks. *Patient will demonstrate independence with HEP in order to increase flexibility and ability to reach overhead for IADL and work tasks. Long Term Goals:  Time Frame: 8 weeks  *  Patient will report sleeping on the L side without waking due to pain.     *  Pt. will be able to don shirt and wash back using L UE without pain in shoulder  *  Patient will improve UEFS to at least 60    Patient Education:   [x]  HEP/Education Completed: Plan of Care, Goals, HEP  [facility]:   Crossbody stretch, Supine dowel ER, Supine dowel chest press, scap pinches, table slides for flexion and abduction  Shoulder flexion dowel AAROM in supine, doorway stretch for biceps/pec minor  Supine thoracic ext over towel roll, sidelying sleeper  Supine shoulder flexion AROM and sidelying ER with towel roll  []  No new Education completed  [x]  Reviewed Prior HEP      [x]

## 2022-12-13 ENCOUNTER — HOSPITAL ENCOUNTER (OUTPATIENT)
Dept: OCCUPATIONAL THERAPY | Age: 52
Setting detail: THERAPIES SERIES
End: 2022-12-13
Payer: COMMERCIAL

## 2022-12-15 ENCOUNTER — APPOINTMENT (OUTPATIENT)
Dept: OCCUPATIONAL THERAPY | Age: 52
End: 2022-12-15
Payer: COMMERCIAL

## 2022-12-19 ENCOUNTER — HOSPITAL ENCOUNTER (OUTPATIENT)
Dept: OCCUPATIONAL THERAPY | Age: 52
Setting detail: THERAPIES SERIES
Discharge: HOME | End: 2022-12-19
Payer: COMMERCIAL

## 2022-12-19 PROCEDURE — 97140 MANUAL THERAPY 1/> REGIONS: CPT

## 2022-12-19 PROCEDURE — 97110 THERAPEUTIC EXERCISES: CPT

## 2022-12-19 NOTE — PROGRESS NOTES
[address]  [] EVALUATION  [x] DAILY NOTE (LAND) [] DAILY NOTE (AQUATIC ) [] PROGRESS NOTE [] DISCHARGE NOTE    [x] OUTPATIENT REHABILITATION [facility]   [] [place] [address]    [] [facility]   [] [facility]    Date: 2022  Patient Name:  Lokesh Jeong  : 1970  MRN: [id number]  CSN: [id number]    Referring Practitioner SARAH Trejo - *   Diagnosis Strain of unspecified muscle, fascia and tendon at shoulder and upper arm level, left arm, initial encounter [C98.949X]    Treatment Diagnosis L Shoulder Strain, L shoulder Pain, L shoulder weakness, L shoulder stiffness   Date of Evaluation 11/10/22      Functional Outcome Measure Used UEFI   Functional Outcome Score 43 (11/10/22)       Insurance: Primary: Payor: [employer] /  /  / ,   Secondary:    Authorization Information: PRE CERTIFICATION REQUIRED: YES, APPROVED FOR PT/OT, 1-3 TIMES A WEEK FOR 12 VISITS, FROM 22 TO 22  UNDER PRESUMPTIVE AUTHORIZATION. IF CLAIM IS NOT APPROVED, PATIENT WILL BE RESPONSIBLE PARTY FOR PAYMENT    INSURANCE THERAPY BENEFIT:     AQUATIC THERAPY COVERED: NO  MODALITIES COVERED:  YES, NO IONT     RECEIVED EXTENSION ON APPROVED C9. AUTH FOR OT NOW RUNS UNTIL 23   Visit # 8, 8/10 for progress note   Visits Allowed: 12   Recertification Date: 3/3/93   Physician Follow-Up: 2022   Physician Orders: 1-3x per week x12 visits, evaluate and treat   Pertinent History: Pt is 44 y/o M who reports he injured his L shoulder at work on 10/20/22 picking up a heavy box and he reports he felt a \"pop\". Pt reports he kept working and figured it would get better, but the pain persisted so he later went to occupational health who took x-rays which revealed no acute osseous abnormalities. Pt reports they placed him on a 10 lb lifting restriction at work and referred pt to OT for eval and tx for L shoulder strain.  Pt reports he had a follow up this morning at [facility] and they plan to schedule an MRI after they see how therapy goes for a few weeks. Pt reported PMH: High Blood Pressure     X-ray Report 10/26/22: \"There is no fracture or dislocation. Glenohumeral joint space is preserved. Mild degenerative changes of the acromioclavicular joint and sclerotic degenerative change at the tuberosity. \"    SUBJECTIVE: Patient went to [facility], and reports he has a frozen shoulder, and reports the injection needs to be an ultrasound guided injection, waiting for approval. Waiting for the approval, reports \"intense therapy\" will continue after this injection. Patient reports he had a long weekend with pain.      TREATMENT   Precautions: general precautions    Pain: 2/10 lateral shoulder   With movement 6/10     X in shaded column indicates Activity Completed Today   Modalities Parameters/  Location  Notes/Comments   MHP with dowel ER x10 min  X Completed in supine this date with shortened session, focused ER with tight end range throughout                Manual Therapy Time/  Technique  Notes/Comments   Manual STM/IASTM to L upper trap, deltoid and posterior shoulder  X Manual STM between stretches this date   GH mobs  X Especially posterior   Gentle supine PROM in all planes to , manual subscap stretch  X Slow progressive stretching in supine and sidelying as well as scapular mobilizations   Exercises   Sets/  Sec Reps  Notes/Comments   Supine dowel chest press AAROM, flexion 1 10     Supine crossbody stretch  15 sec 5 X Manual by therapist this date date    Supine dowel ER 10 sec 5 X    Table slides for flexion 1 10     Scap pinches 1 10 X    Pulleys for shoulder flexion 1 3 min X Cool down this date   Doorway stretch for biceps/pec minor 15 sec 3     Supine pec minor/biceps stretch over towel roll for thoracic extension 20 sec 3 X Pain noted    Sidelying sleeper 15 3 X    Supine flexion AROM, Sidelying ER AROM with towel roll at side 1 10 X Hand behind back for IR 1 5            Activities Time    Notes/Comments   Kinesiotape- Deltoid inhibition, upper trap inhibition, mechanical correction ant to post shoulder    Modified this date to trial deltoid inhibition, upper trap inhibition, mechanical correction anterior to posterior shoulder                 Specific Interventions Next Treatment: MHP, STM, joint mobs, ROM    Activity/Treatment Tolerance:  []  Patient tolerated treatment well  []  Patient limited by fatigue  [x]  Patient limited by pain   []  Patient limited by other medical complications  []  Other:     Assessment: Patient progressing toward goals. Continues to tolerate progressive stretching well, but very tight posterior capsule noted throughout all end ranges. Patient presented with pain with pec stretch noted over vertical towel roll this date. Continued education on importance of stretching and heat at home to continue to improve ROM. Patient awaiting approval for guided injection now. Areas for Improvement: impaired ROM, impaired strength, and pain  Prognosis: good    GOALS:  Patient Goal: decrease pain, increase ROM of shoulder, return to work without restrictions    Short Term Goals:  Time Frame: 4 weeks  * Patient will increase L shoulder flexion to 110, abduction to 100, IR to 1 inch below waistline, ER to 45 for increased independence with dressing tasks. *Patient will report pain with activity no higher than 3/10 to increase ease and independence with ADL and IADL tasks. *Patient will demonstrate independence with HEP in order to increase flexibility and ability to reach overhead for IADL and work tasks. Long Term Goals:  Time Frame: 8 weeks  *  Patient will report sleeping on the L side without waking due to pain.     *  Pt. will be able to don shirt and wash back using L UE without pain in shoulder  *  Patient will improve UEFS to at least 60    Patient Education:   []  HEP/Education Completed: Plan of Care, Goals, HEP  [facility] Access Code for HEP:   Crossbody stretch, Supine dowel ER, Supine dowel chest press, scap pinches, table slides for flexion and abduction  Shoulder flexion dowel AAROM in supine, doorway stretch for biceps/pec minor  Supine thoracic ext over towel roll, sidelying sleeper  Supine shoulder flexion AROM and sidelying ER with towel roll  []  No new Education completed  [x]  Reviewed Prior HEP      [x]  Patient verbalized and/or demonstrated understanding of education provided. []  Patient unable to verbalize and/or demonstrate understanding of education provided. Will continue education. [x]  Barriers to learning: none    PLAN:  Treatment Recommendations: Strengthening, Range of Motion, Manual Therapy - Soft Tissue Mobilization, Manual Therapy - Joint Manipulation, Pain Management, Home Exercise Program, Patient Education, Safety Education and Training, and Self-Care Education and Training    []  Plan of care initiated. Plan to see patient 2 times per week for 8 weeks to address the treatment planned outlined above.   [x]  Continue with current plan of care  []  Modify plan of care as follows:    []  Hold pending physician visit  []  Discharge    Time In 0800   Time Out 0832   Timed Code Minutes: 32 min   Total Treatment Time: 32 min     CPT CODE TIME-IN TIME-OUT TOTAL TIME   Manual-[id number] 0800 0815 15 min   Ther Ex-[id number] 0815 0832 17 min               TOTAL SESSION 0800 0832 32 min           Lory MCKENNA/TITO #[id number]

## 2022-12-22 ENCOUNTER — HOSPITAL ENCOUNTER (OUTPATIENT)
Dept: OCCUPATIONAL THERAPY | Age: 52
Setting detail: THERAPIES SERIES
Discharge: HOME | End: 2022-12-22
Payer: COMMERCIAL

## 2022-12-22 PROCEDURE — 97110 THERAPEUTIC EXERCISES: CPT

## 2022-12-22 NOTE — PROGRESS NOTES
[address]  [] EVALUATION  [x] DAILY NOTE (LAND) [] DAILY NOTE (AQUATIC ) [] PROGRESS NOTE [] DISCHARGE NOTE    [x] OUTPATIENT REHABILITATION [facility]   [] [place] [address]    [] [facility]   [] [facility]    Date: 2022  Patient Name:  Praveena Valdez  : 1970  MRN: [id number]  CSN: [id number]    Referring Practitioner SARAH Vasquez - *   Diagnosis Strain of unspecified muscle, fascia and tendon at shoulder and upper arm level, left arm, initial encounter [T75.926J]    Treatment Diagnosis L Shoulder Strain, L shoulder Pain, L shoulder weakness, L shoulder stiffness   Date of Evaluation 11/10/22      Functional Outcome Measure Used UEFI   Functional Outcome Score 43 (11/10/22)       Insurance: Primary: Payor: Olegario BLOOM /  /  / ,   Secondary:    Authorization Information: PRE CERTIFICATION REQUIRED: YES, APPROVED FOR PT/OT, 1-3 TIMES A WEEK FOR 12 VISITS, FROM 22 TO 22  UNDER PRESUMPTIVE AUTHORIZATION. IF CLAIM IS NOT APPROVED, PATIENT WILL BE RESPONSIBLE PARTY FOR PAYMENT    INSURANCE THERAPY BENEFIT:     AQUATIC THERAPY COVERED: NO  MODALITIES COVERED:  YES, NO IONT     RECEIVED EXTENSION ON APPROVED C9. AUTH FOR OT NOW RUNS UNTIL 23   Visit # 9, 9/10 for progress note   Visits Allowed: 12   Recertification Date: 33   Physician Follow-Up: 2022   Physician Orders: 1-3x per week x12 visits, evaluate and treat   Pertinent History: Pt is 46 y/o M who reports he injured his L shoulder at work on 10/20/22 picking up a heavy box and he reports he felt a \"pop\". Pt reports he kept working and figured it would get better, but the pain persisted so he later went to occupational health who took x-rays which revealed no acute osseous abnormalities. Pt reports they placed him on a 10 lb lifting restriction at work and referred pt to OT for eval and tx for L shoulder strain.  Pt reports he had a follow up this morning at [facility] and they plan to schedule an MRI after they see how therapy goes for a few weeks. Pt reported PMH: High Blood Pressure     X-ray Report 10/26/22: \"There is no fracture or dislocation. Glenohumeral joint space is preserved. Mild degenerative changes of the acromioclavicular joint and sclerotic degenerative change at the tuberosity. \"    SUBJECTIVE: Pt stating he is more \"achy\" today and feels like it has something to do with the weather. Pt stating he still has not heard from [facility] yet regarding his injection.    TREATMENT   Precautions: general precautions    Pain: 3/10 in sitting lateral shoulder   With movement 7/10     X in shaded column indicates Activity Completed Today   Modalities Parameters/  Location  Notes/Comments   MHP with dowel ER x10 min  Xx Completed in supine this date with shortened session, focused ER with tight end range throughout                Manual Therapy Time/  Technique  Notes/Comments   Manual STM/IASTM to L upper trap, deltoid and posterior shoulder   Manual STM between stretches this date   [address] mobs  Xx Especially posterior   Gentle supine PROM in all planes to , manual subscap stretch  Xx Slow progressive stretching in supine and sidelying as well as scapular mobilizations   Exercises   Sets/  Sec Reps  Notes/Comments   Supine dowel chest press AAROM, flexion 1 10 xx    Supine crossbody stretch  15 sec 5 xx Completed in sitting    Supine dowel ER 10 sec 5 Xx    Table slides for flexion 1 10     Scap pinches 1 10 Xx    Pulleys for shoulder flexion 1 3 min Xx Cool down this date   Doorway stretch for biceps/pec minor 15 sec 3     Supine pec minor/biceps stretch over towel roll for thoracic extension 20 sec 3  Pain noted    Sidelying sleeper 15 3 Xx    Supine flexion AROM, Sidelying ER AROM with towel roll at side 1 10     Hand behind back for IR 1 5            Activities Time    Notes/Comments   Kinesiotape- Deltoid inhibition, upper trap inhibition, mechanical correction ant to post shoulder    Modified this date to trial deltoid inhibition, upper trap inhibition, mechanical correction anterior to posterior shoulder                 Specific Interventions Next Treatment: MHP, STM, joint mobs, ROM    Activity/Treatment Tolerance:  []  Patient tolerated treatment well  []  Patient limited by fatigue  [x]  Patient limited by pain   []  Patient limited by other medical complications  []  Other:     Assessment: Patient progressing toward goals. Pt with increased achiness this date with decreased tolerance at times especially with rotational movements. Areas for Improvement: impaired ROM, impaired strength, and pain  Prognosis: good    GOALS:  Patient Goal: decrease pain, increase ROM of shoulder, return to work without restrictions    Short Term Goals:  Time Frame: 4 weeks  * Patient will increase L shoulder flexion to 110, abduction to 100, IR to 1 inch below waistline, ER to 45 for increased independence with dressing tasks. *Patient will report pain with activity no higher than 3/10 to increase ease and independence with ADL and IADL tasks. *Patient will demonstrate independence with HEP in order to increase flexibility and ability to reach overhead for IADL and work tasks. Long Term Goals:  Time Frame: 8 weeks  *  Patient will report sleeping on the L side without waking due to pain.     *  Pt. will be able to don shirt and wash back using L UE without pain in shoulder  *  Patient will improve UEFS to at least 60    Patient Education:   []  HEP/Education Completed: Plan of Care, Goals, HEP  [facility] Access [facility] for [facility]P:   Crossbody stretch, Supine dowel ER, Supine dowel chest press, scap pinches, table slides for flexion and abduction  Shoulder flexion dowel AAROM in supine, doorway stretch for biceps/pec minor  Supine thoracic ext over towel roll, sidelying sleeper  Supine shoulder flexion AROM and sidelying ER with towel roll  []  No new Education completed  [x]  Reviewed Prior HEP      [x]  Patient verbalized and/or demonstrated understanding of education provided. []  Patient unable to verbalize and/or demonstrate understanding of education provided. Will continue education. [x]  Barriers to learning: none    PLAN:  Treatment Recommendations: Strengthening, Range of Motion, Manual Therapy - Soft Tissue Mobilization, Manual Therapy - Joint Manipulation, Pain Management, Home Exercise Program, Patient Education, Safety Education and Training, and Self-Care Education and Training    []  Plan of care initiated. Plan to see patient 2 times per week for 8 weeks to address the treatment planned outlined above.   [x]  Continue with current plan of care  []  Modify plan of care as follows:    []  Hold pending physician visit  []  Discharge    Time In 0730   Time Out 0815   Timed Code Minutes: 45 min   Total Treatment Time: 45 min     CPT CODE TIME-IN TIME-OUT TOTAL TIME   Manual-[id number]      Ther Ex-[id number] 0730 0815 45 min               TOTAL SESSION 0730 0815 45 min           Glen VIRK OTR/L [id number]

## 2022-12-28 ENCOUNTER — HOSPITAL ENCOUNTER (OUTPATIENT)
Dept: OCCUPATIONAL THERAPY | Age: 52
Setting detail: THERAPIES SERIES
Discharge: HOME | End: 2022-12-28
Payer: COMMERCIAL

## 2022-12-28 PROCEDURE — 97110 THERAPEUTIC EXERCISES: CPT

## 2022-12-28 PROCEDURE — 97140 MANUAL THERAPY 1/> REGIONS: CPT

## 2022-12-28 NOTE — PROGRESS NOTES
[address]  [] EVALUATION  [x] DAILY NOTE (LAND) [] DAILY NOTE (AQUATIC ) [] PROGRESS NOTE [] DISCHARGE NOTE    [x] OUTPATIENT REHABILITATION [facility]   [] [place] [address]    [] [facility]   [] Fabian Matute    Date: 2022  Patient Name:  Gianfranco Jennings  : 1970  MRN: [id number]  CSN: [id number]    Referring Practitioner SARAH Schaffer - *   Diagnosis Strain of unspecified muscle, fascia and tendon at shoulder and upper arm level, left arm, initial encounter [O93.839Y]    Treatment Diagnosis L Shoulder Strain, L shoulder Pain, L shoulder weakness, L shoulder stiffness   Date of Evaluation 11/10/22      Functional Outcome Measure Used UEFI   Functional Outcome Score 43 (11/10/22)       Insurance: Primary: Payor: Rosa Langley [facility] /  /  / ,   Secondary:    Authorization Information: PRE CERTIFICATION REQUIRED: YES, APPROVED FOR PT/OT, 1-3 TIMES A WEEK FOR 12 VISITS, FROM 22 TO 22  UNDER PRESUMPTIVE AUTHORIZATION. IF CLAIM IS NOT APPROVED, PATIENT WILL BE RESPONSIBLE PARTY FOR PAYMENT    INSURANCE THERAPY BENEFIT:     AQUATIC THERAPY COVERED: NO  MODALITIES COVERED:  YES, NO IONT     RECEIVED EXTENSION ON APPROVED C9. AUTH FOR OT NOW RUNS UNTIL 23   Visit # 10, 10/12 for progress note   Visits Allowed: 12   Recertification Date: 82   Physician Follow-Up: 2022   Physician Orders: 1-3x per week x12 visits, evaluate and treat   Pertinent History: Pt is 46 y/o M who reports he injured his L shoulder at work on 10/20/22 picking up a heavy box and he reports he felt a \"pop\". Pt reports he kept working and figured it would get better, but the pain persisted so he later went to occupational health who took x-rays which revealed no acute osseous abnormalities. Pt reports they placed him on a 10 lb lifting restriction at work and referred pt to OT for eval and tx for L shoulder strain.  Pt reports he had a follow up this morning at [facility] and they plan to schedule an MRI after they see how therapy goes for a few weeks. Pt reported PMH: High Blood Pressure     X-ray Report 10/26/22: \"There is no fracture or dislocation. Glenohumeral joint space is preserved. Mild degenerative changes of the acromioclavicular joint and sclerotic degenerative change at the tuberosity. \"    SUBJECTIVE: Pt states his left shoulder feels a little better at rest but is still very painful with activity. Has 1 more visit remaining and then goes back to worker's comp doctor 1/6/23, pt.  Waiting on approval for the ultrasound guided injection   TREATMENT   Precautions: general precautions    Pain: 1/10 in sitting lateral shoulder   With movement 7/10     X in shaded column indicates Activity Completed Today   Modalities Parameters/  Location  Notes/Comments   P with dowel ER stretching x10 min  X                Manual Therapy Time/  Technique  Notes/Comments   Manual STM/IASTM to L upper trap, deltoid and posterior shoulder   Manual STM between stretches this date   GH mobs  X    supine PROM with gradual stretch in all planes to tolerance , manual subscap stretch  X    Exercises   Sets/  Sec Reps  Notes/Comments   Supine dowel flexion from 90 to tolerance with hold at end range 5 sec 10 X    Supine crossbody stretch  15 sec 5 X    Supine dowel ER 10 sec 5  ER with dowel completed while on [facility]   Table slides for flexion 1 10     Scap pinches 1 10     Pulleys for shoulder flexion 10 sec at end range 10 X Cool down this date   Doorway stretch for biceps/pec minor 15 sec 5 X With arms straight and at 45 degrees on door frame   Supine pec minor/biceps stretch over towel roll for thoracic extension 20 sec 3  Pain noted    Sidelying sleeper 15 5 X    Supine flexion AROM, Sidelying ER AROM with towel roll at side 1 10     Hand behind back for IR 1 5            Activities Time    Notes/Comments   Kinesiotape- Deltoid inhibition, upper trap inhibition, mechanical correction ant to post shoulder    Modified this date to trial deltoid inhibition, upper trap inhibition, mechanical correction anterior to posterior shoulder                 Specific Interventions Next Treatment: MHP, STM, joint mobs, ROM    Activity/Treatment Tolerance:  []  Patient tolerated treatment well  []  Patient limited by fatigue  [x]  Patient limited by pain   []  Patient limited by other medical complications  []  Other:     Assessment: Patient progressing slowly toward goals. Left shoulder remains very tight for PROM and stretching. Will complete PN next session. Areas for Improvement: impaired ROM, impaired strength, and pain  Prognosis: good    GOALS:  Patient Goal: decrease pain, increase ROM of shoulder, return to work without restrictions    Short Term Goals:  Time Frame: 4 weeks  * Patient will increase L shoulder flexion to 110, abduction to 100, IR to 1 inch below waistline, ER to 45 for increased independence with dressing tasks. *Patient will report pain with activity no higher than 3/10 to increase ease and independence with ADL and IADL tasks. *Patient will demonstrate independence with HEP in order to increase flexibility and ability to reach overhead for IADL and work tasks. Long Term Goals:  Time Frame: 8 weeks  *  Patient will report sleeping on the L side without waking due to pain.     *  Pt. will be able to don shirt and wash back using L UE without pain in shoulder  *  Patient will improve UEFS to at least 60    Patient Education:   []  HEP/Education Completed: Plan of Care, Goals, HEP  [facility] for HEP:   Crossbody stretch, Supine dowel ER, Supine dowel chest press, scap pinches, table slides for flexion and abduction  Shoulder flexion dowel AAROM in supine, doorway stretch for biceps/pec minor  Supine thoracic ext over towel roll, sidelying sleeper  Supine shoulder flexion AROM and sidelying ER with towel roll  []  No new Education completed  [x]  Reviewed Prior HEP      [x]  Patient verbalized and/or demonstrated understanding of education provided. []  Patient unable to verbalize and/or demonstrate understanding of education provided. Will continue education. [x]  Barriers to learning: none    PLAN:  Treatment Recommendations: Strengthening, Range of Motion, Manual Therapy - Soft Tissue Mobilization, Manual Therapy - Joint Manipulation, Pain Management, Home Exercise Program, Patient Education, Safety Education and Training, and Self-Care Education and Training    []  Plan of care initiated. Plan to see patient 2 times per week for 8 weeks to address the treatment planned outlined above.   [x]  Continue with current plan of care  []  Modify plan of care as follows:    []  Hold pending physician visit  []  Discharge    Time In 0800   Time Out 0845   Timed Code Minutes: 45 min   Total Treatment Time: 45 min     CPT CODE TIME-IN TIME-OUT TOTAL TIME   Manual-[id number] 0810 0825 15 min   Ther Ex-[id number] 0800  0825 0810  0845 10 min  20               TOTAL SESSION 0800 0845 45 min           Matthew 68, OTR/L [id number]

## 2022-12-30 ENCOUNTER — HOSPITAL ENCOUNTER (OUTPATIENT)
Dept: OCCUPATIONAL THERAPY | Age: 52
Setting detail: THERAPIES SERIES
Discharge: HOME | End: 2022-12-30
Payer: COMMERCIAL

## 2022-12-30 PROCEDURE — 97140 MANUAL THERAPY 1/> REGIONS: CPT

## 2022-12-30 PROCEDURE — 97110 THERAPEUTIC EXERCISES: CPT

## 2022-12-30 NOTE — PROGRESS NOTES
[address]  [] EVALUATION  [] DAILY NOTE (LAND) [] DAILY NOTE (AQUATIC ) [x] PROGRESS NOTE [] DISCHARGE NOTE    [x] OUTPATIENT REHABILITATION [facility]   [] [place] [address]    [] [facility]   [] Chai Zayra    Date: 2022  Patient Name:  Lyly Gotti  : 1970  MRN: [id number]  CSN: [id number]    Referring Practitioner SARAH Carlin - *   Diagnosis Strain of unspecified muscle, fascia and tendon at shoulder and upper arm level, left arm, initial encounter [C40.767H]    Treatment Diagnosis L Shoulder Strain, L shoulder Pain, L shoulder weakness, L shoulder stiffness   Date of Evaluation 11/10/22      Functional Outcome Measure Used UEFI   Functional Outcome Score 43 (11/10/22) 61 (22)      Insurance: Primary: Payor: José Antonio [employer] /  /  / ,   Secondary:    Authorization Information: PRE CERTIFICATION REQUIRED: YES, APPROVED FOR PT/OT, 1-3 TIMES A WEEK FOR 12 VISITS, FROM 22 TO 22  UNDER PRESUMPTIVE AUTHORIZATION. IF CLAIM IS NOT APPROVED, PATIENT WILL BE RESPONSIBLE PARTY FOR PAYMENT    INSURANCE THERAPY BENEFIT:     AQUATIC THERAPY COVERED: NO  MODALITIES COVERED:  YES, NO IONT     RECEIVED EXTENSION ON APPROVED C9. AUTH FOR OT NOW RUNS UNTIL 23   Visit # 11, PN completed 22   Visits Allowed: 12   Recertification Date: 1/3/89   Physician Follow-Up: 2023   Physician Orders: 1-3x per week x12 visits, evaluate and treat   Pertinent History: Pt is 44 y/o M who reports he injured his L shoulder at work on 10/20/22 picking up a heavy box and he reports he felt a \"pop\". Pt reports he kept working and figured it would get better, but the pain persisted so he later went to occupational health who took x-rays which revealed no acute osseous abnormalities. Pt reports they placed him on a 10 lb lifting restriction at work and referred pt to OT for eval and tx for L shoulder strain. inhibition, mechanical correction anterior to posterior shoulder                 Specific Interventions Next Treatment: MHP, STM, joint mobs, ROM    Activity/Treatment Tolerance:  []  Patient tolerated treatment well  []  Patient limited by fatigue  [x]  Patient limited by pain   []  Patient limited by other medical complications  []  Other:     Assessment: Patient has participated in 6 occupational therapy visits due to L shoulder strain secondary to a work-related injury. Pt has made slow progress toward goals. Resting pain level has reduced significantly compared to evaluation and some progress made in pain with activity, however, certain activities, yaima lifting tasks, cause increased pain. Pt has met STG for shoulder flexion but pt remains extremely tight in ER and IR. Pt is compliant and independent with HEP and demos good attendance to therapy visits. Pt's UEFI score has improved from 43 to 61 compared to evaluation. Pt reports his sleep has improved and he can now make his arm assist with washing and grooming his hair but it is still somewhat difficult. Pt reports he did fall at work and slammed his shoulder into a bookcase two days ago and that elicited increased pain. Pt reports his physician has recommended an US guided injection followed by aggressive therapy and are currently awaiting approval. Will see pt for final visit next week and pt to follow up with physician on 1/6/23 to discuss the need for further therapy. Will discharge vs continued therapy based upon physician visit and injection approval.    Areas for Improvement: impaired ROM, impaired strength, and pain  Prognosis: good    GOALS:  Patient Goal: decrease pain, increase ROM of shoulder, return to work without restrictions    Short Term Goals:  Time Frame: 4 weeks  * Patient will increase L shoulder flexion to 110, abduction to 100, IR to 1 inch below waistline, ER to 45 for increased independence with dressing tasks.  GOAL PARTIALLY MET Clavrgo=896, Abduction=96, IR=2\" below waistline, ER=30, patient reports dressing is easier but min pain remains when donning belt and shirt REVISE GOAL Pt will increase L shoulder AROM qcuqgfo=198, Idkecrieo=965, IR to 1 inch below waistline, and ER to 40 for increased ease of dressing tasks. *Patient will report pain with activity no higher than 3/10 to increase ease and independence with ADL and IADL tasks. GOAL NOT MET pt reports pain has improved with many tasks but 5/10 pain persists when reaching into the refrigerator to grab gallon of milk and when donning his belt CONTINUE GOAL  *Patient will demonstrate independence with HEP in order to increase flexibility and ability to reach overhead for IADL and work tasks. GOAL MET continue as new HEPs are assigned CONTINUE GOAL    Long Term Goals:  Time Frame: 8 weeks  *  Patient will report sleeping on the L side without waking due to pain.   GOAL NOT MET pt reports sleep has improved but he still wakes occasionally due to his L shoulder CONTINUE GOAL  *  Pt. will be able to don shirt and wash back using L UE without pain in shoulder GOAL NOT MET 3/10 pain with donning shirt, unable to was back with L UE without pain REVISE GOAL Pt will be able to marian shirt using L UE without pain in shoulder  *  Patient will improve UEFS to at least 60 GOAL MET 61/80 this date REVISE GOAL Patient will improve UEFI scroe to at least 68    Patient Education:   []  HEP/Education Completed: Plan of Care, Goals, HEP  [employer] Access Code for HEP:   Crossbody stretch, Supine dowel ER, Supine dowel chest press, scap pinches, table slides for flexion and abduction  Shoulder flexion dowel AAROM in supine, doorway stretch for biceps/pec minor  Supine thoracic ext over towel roll, sidelying sleeper  Supine shoulder flexion AROM and sidelying ER with towel roll  []  No new Education completed  [x]  Reviewed Prior HEP      [x]  Patient verbalized and/or demonstrated understanding of education provided. []  Patient unable to verbalize and/or demonstrate understanding of education provided. Will continue education. [x]  Barriers to learning: none    PLAN:  Treatment Recommendations: Strengthening, Range of Motion, Manual Therapy - Soft Tissue Mobilization, Manual Therapy - Joint Manipulation, Pain Management, Home Exercise Program, Patient Education, Safety Education and Training, and Self-Care Education and Training    []  Plan of care initiated. Plan to see patient 2 times per week for 8 weeks to address the treatment planned outlined above.   []  Continue with current plan of care  [x]  Modify plan of care as follows: will see pt for one more visit to finish out C9 and progress ROM, will await further physician orders/injection approval after last remaining visit   []  Hold pending physician visit  []  Discharge    Time In 0758   Time Out 0844   Timed Code Minutes: 46 min   Total Treatment Time: 46 min     CPT CODE TIME-IN TIME-OUT TOTAL TIME   Manual-[id number] 0813 0827 15 min   Ther Ex-79028 0758  0827 0813  0844 15 min  16 min               TOTAL SESSION 0758 0846 46 min           [facility], [address] [place] [address], OTR/L [id number]

## 2023-01-05 ENCOUNTER — HOSPITAL ENCOUNTER (OUTPATIENT)
Dept: OCCUPATIONAL THERAPY | Age: 53
Setting detail: THERAPIES SERIES
Discharge: HOME | End: 2023-01-05
Payer: COMMERCIAL

## 2023-01-05 PROCEDURE — 97140 MANUAL THERAPY 1/> REGIONS: CPT

## 2023-01-05 PROCEDURE — 97110 THERAPEUTIC EXERCISES: CPT

## 2023-01-05 NOTE — PROGRESS NOTES
[address]  [] EVALUATION  [] DAILY NOTE (LAND) [] DAILY NOTE (AQUATIC ) [x] PROGRESS NOTE [] DISCHARGE NOTE    [x] OUTPATIENT REHABILITATION [facility]   [] [place] [address]    [] [facility]   [] Adriana Gupta    Date: 2023  Patient Name:  Ariana Michelle  : 1970  MRN: [id number]  CSN: [id number]    Referring Practitioner SARAH Barajas - *   Diagnosis Strain of unspecified muscle, fascia and tendon at shoulder and upper arm level, left arm, initial encounter [B58.260K]    Treatment Diagnosis L Shoulder Strain, L shoulder Pain, L shoulder weakness, L shoulder stiffness   Date of Evaluation 11/10/22      Functional Outcome Measure Used UEFI   Functional Outcome Score 43 (11/10/22) 61 (22)      Insurance: Primary: Payor: Antonio Quijano [facility] /  /  / ,   Secondary:    Authorization Information: PRE CERTIFICATION REQUIRED: YES, APPROVED FOR PT/OT, 1-3 TIMES A WEEK FOR 12 VISITS, FROM 22 TO 22  UNDER PRESUMPTIVE AUTHORIZATION. IF CLAIM IS NOT APPROVED, PATIENT WILL BE RESPONSIBLE PARTY FOR PAYMENT    INSURANCE THERAPY BENEFIT:     AQUATIC THERAPY COVERED: NO  MODALITIES COVERED:  YES, NO IONT     RECEIVED EXTENSION ON APPROVED C9. AUTH FOR OT NOW RUNS UNTIL 23   Visit # 12, PN completed 22   Visits Allowed: 12   Recertification Date: 12   Physician Follow-Up: 2023   Physician Orders: 1-3x per week x12 visits, evaluate and treat   Pertinent History: Pt is 44 y/o M who reports he injured his L shoulder at work on 10/20/22 picking up a heavy box and he reports he felt a \"pop\". Pt reports he kept working and figured it would get better, but the pain persisted so he later went to occupational health who took x-rays which revealed no acute osseous abnormalities. Pt reports they placed him on a 10 lb lifting restriction at work and referred pt to OT for eval and tx for L shoulder strain.  Pt reports he had a follow up this morning at [facility] and they plan to schedule an MRI after they see how therapy goes for a few weeks. Pt reported PMH: High Blood Pressure     X-ray Report 10/26/22: \"There is no fracture or dislocation. Glenohumeral joint space is preserved. Mild degenerative changes of the acromioclavicular joint and sclerotic degenerative change at the tuberosity. \"    SUBJECTIVE: Pt reporting he seen physician at [facility] yesterday. Pt still awaiting on approval for US guided injections for left shoulder. Pt stating physician made no comments in regards to continuing his therapy. Pt stating physician asked if therapy was helping which pt stating \"it was helping a great bit. \"  Pt stating that some days he can wash his hair with no problems and other days he can not . 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 Pt stating his sleeping is getting better.    TREATMENT   Precautions: general precautions    Pain: \"normal ache\"     X in shaded column indicates Activity Completed Today   Modalities Parameters/  Location  Notes/Comments   [facility] with dowel ER stretching x10 min  Xx                Manual Therapy Time/  Technique  Notes/Comments   Manual STM/IASTM to L upper trap, deltoid and posterior shoulder   Manual STM between stretches this date   GH mobs  Xx    supine PROM with gradual stretch in all planes to tolerance , manual subscap stretch  Xx Tight in ER and IR    Exercises   Sets/  Sec Reps  Notes/Comments   Supine dowel flexion from 90 to tolerance with hold at end range 5 sec 10 Xx    Supine crossbody stretch  15 sec 5 Xx While supine    Supine dowel ER 10 sec 5 xx ER with dowel completed while on [facility]   Table slides for flexion 1 10     Scap pinches 1 10     Pulleys for shoulder flexion 10 sec at end range 10  Cool down this date   Doorway stretch for biceps/pec minor 15 sec 5 Xx ER with palm against door frame turning out   Supine pec minor/biceps stretch over towel roll for thoracic extension 20 sec 3  Pain noted    Sidelying sleeper 15 5     Supine flexion AROM, Sidelying ER AROM with towel roll at side 1 10     Hand behind back for IR 1 5 xx Using dowel to assist bringing hand across back           Activities Time    Notes/Comments   Kinesiotape- Deltoid inhibition, upper trap inhibition, mechanical correction ant to post shoulder    Modified this date to trial deltoid inhibition, upper trap inhibition, mechanical correction anterior to posterior shoulder                 Specific Interventions Next Treatment: MHP, STM, joint mobs, ROM    Activity/Treatment Tolerance:  []  Patient tolerated treatment well  []  Patient limited by fatigue  [x]  Patient limited by pain   []  Patient limited by other medical complications  []  Other:     Assessment: Pt has been progressing towards goals with OT services. Pt stating his pain is getting better being able to complete more tasks. Pt continues to remain tight in ER and IR movements especially. With pt making progress, will ask for further auth for more visit to continue OT services to continue to improve his movement and decrease his pain in left shoulder for ease of work tasks and ADL tasks. Areas for Improvement: impaired ROM, impaired strength, and pain  Prognosis: good    GOALS:  Patient Goal: decrease pain, increase ROM of shoulder, return to work without restrictions    Short Term Goals:  Time Frame: 4 weeks  * Patient will increase L shoulder flexion to 110, abduction to 100, IR to 1 inch below waistline, ER to 45 for increased independence with dressing tasks.  GOAL PARTIALLY MET Svklopz=916, Abduction=96, IR=2\" below waistline, ER=30, patient reports dressing is easier but min pain remains when donning belt and shirt REVISE GOAL Pt will increase L shoulder AROM gcnbgjg=735, Dodimdqoy=187, IR to 1 inch below waistline, and ER to 40 for increased ease of dressing tasks.  *Patient will report pain with activity no higher than 3/10 to increase ease and independence with ADL and IADL tasks. GOAL NOT MET pt reports pain has improved with many tasks but 5/10 pain persists when reaching into the refrigerator to grab gallon of milk and when donning his belt CONTINUE GOAL  *Patient will demonstrate independence with HEP in order to increase flexibility and ability to reach overhead for IADL and work tasks. GOAL MET continue as new HEPs are assigned CONTINUE GOAL    Long Term Goals:  Time Frame: 8 weeks  *  Patient will report sleeping on the L side without waking due to pain. GOAL NOT MET pt reports sleep has improved but he still wakes occasionally due to his L shoulder CONTINUE GOAL  *  Pt. will be able to don shirt and wash back using L UE without pain in shoulder GOAL NOT MET 3/10 pain with donning shirt, unable to was back with L UE without pain REVISE GOAL Pt will be able to marian shirt using L UE without pain in shoulder  *  Patient will improve UEFS to at least 60 GOAL MET 61/80 this date REVISE GOAL Patient will improve UEFI scroe to at least 68    Patient Education:   []  HEP/Education Completed: Plan of Care, Goals, HEP  [employer] Access Code for HEP:   Crossbody stretch, Supine dowel ER, Supine dowel chest press, scap pinches, table slides for flexion and abduction  Shoulder flexion dowel AAROM in supine, doorway stretch for biceps/pec minor  Supine thoracic ext over towel roll, sidelying sleeper  Supine shoulder flexion AROM and sidelying ER with towel roll  []  No new Education completed  [x]  Reviewed Prior HEP      [x]  Patient verbalized and/or demonstrated understanding of education provided. []  Patient unable to verbalize and/or demonstrate understanding of education provided. Will continue education.   [x]  Barriers to learning: none    PLAN:  Treatment Recommendations: Strengthening, Range of Motion, Manual Therapy - Soft Tissue Mobilization, Manual Therapy - Joint Manipulation, Pain Management, Home Exercise Program, Patient Education, Safety Education and Training, and Self-Care Education and Training    []  Plan of care initiated. Plan to see patient 2 times per week for 8 weeks to address the treatment planned outlined above.   []  Continue with current plan of care  [x]  Modify plan of care as follows: will see pt for one more visit to finish out C9 and progress ROM, will await further physician orders/injection approval after last remaining visit   []  Hold pending physician visit  []  Discharge    Time In 0801   Time Out 0845   Timed Code Minutes: 44 min   Total Treatment Time: 44 min     CPT CODE TIME-IN TIME-OUT TOTAL TIME   Manual-[id number] 0801 0813 12 min   Ther Ex-[id number] 0814 0844 32 min               TOTAL SESSION 0758 0846 44 min           Abner Rubinstein MOT OTR/L [id number]

## 2024-06-15 ENCOUNTER — HOSPITAL ENCOUNTER (EMERGENCY)
Age: 54
Discharge: HOME | End: 2024-06-15
Attending: EMERGENCY MEDICINE
Payer: COMMERCIAL

## 2024-06-15 ENCOUNTER — APPOINTMENT (OUTPATIENT)
Dept: CT IMAGING | Age: 54
End: 2024-06-15
Payer: COMMERCIAL

## 2024-06-15 ENCOUNTER — APPOINTMENT (OUTPATIENT)
Dept: GENERAL RADIOLOGY | Age: 54
End: 2024-06-15
Payer: COMMERCIAL

## 2024-06-15 VITALS
RESPIRATION RATE: 23 BRPM | HEART RATE: 98 BPM | DIASTOLIC BLOOD PRESSURE: 96 MMHG | OXYGEN SATURATION: 94 % | TEMPERATURE: 98 F | SYSTOLIC BLOOD PRESSURE: 141 MMHG

## 2024-06-15 DIAGNOSIS — R10.13 ABDOMINAL PAIN, EPIGASTRIC: Primary | ICD-10-CM

## 2024-06-15 DIAGNOSIS — K57.90 DIVERTICULOSIS: ICD-10-CM

## 2024-06-15 LAB
ALBUMIN SERPL BCG-MCNC: 3.9 G/DL (ref 3.5–5.1)
ALP SERPL-CCNC: 72 U/L (ref 38–126)
ALT SERPL W/O P-5'-P-CCNC: 29 U/L (ref 11–66)
ANION GAP SERPL CALC-SCNC: 12 MEQ/L (ref 8–16)
AST SERPL-CCNC: 20 U/L (ref 5–40)
BASOPHILS ABSOLUTE: 0 THOU/MM3 (ref 0–0.1)
BASOPHILS NFR BLD AUTO: 0.2 %
BILIRUB SERPL-MCNC: 1 MG/DL (ref 0.3–1.2)
BUN SERPL-MCNC: 16 MG/DL (ref 7–22)
CALCIUM SERPL-MCNC: 9.1 MG/DL (ref 8.5–10.5)
CHLORIDE SERPL-SCNC: 100 MEQ/L (ref 98–111)
CO2 SERPL-SCNC: 23 MEQ/L (ref 23–33)
CREAT SERPL-MCNC: 0.7 MG/DL (ref 0.4–1.2)
DEPRECATED RDW RBC AUTO: 44.3 FL (ref 35–45)
EOSINOPHIL NFR BLD AUTO: 0.1 %
EOSINOPHILS ABSOLUTE: 0 THOU/MM3 (ref 0–0.4)
ERYTHROCYTE [DISTWIDTH] IN BLOOD BY AUTOMATED COUNT: 13.8 % (ref 11.5–14.5)
GLUCOSE SERPL-MCNC: 120 MG/DL (ref 70–108)
HCT VFR BLD AUTO: 47.6 % (ref 42–52)
HGB BLD-MCNC: 16.1 GM/DL (ref 14–18)
IMM GRANULOCYTES # BLD AUTO: 0.06 THOU/MM3 (ref 0–0.07)
IMM GRANULOCYTES NFR BLD AUTO: 0.5 %
LACTIC ACID, SEPSIS: 1.4 MMOL/L (ref 0.5–1.9)
LIPASE SERPL-CCNC: 17.7 U/L (ref 5.6–51.3)
LYMPHOCYTES ABSOLUTE: 0.9 THOU/MM3 (ref 1–4.8)
LYMPHOCYTES NFR BLD AUTO: 7.1 %
MCH RBC QN AUTO: 29.7 PG (ref 26–33)
MCHC RBC AUTO-ENTMCNC: 33.8 GM/DL (ref 32.2–35.5)
MCV RBC AUTO: 87.8 FL (ref 80–94)
MONOCYTES ABSOLUTE: 1.2 THOU/MM3 (ref 0.4–1.3)
MONOCYTES NFR BLD AUTO: 9.3 %
NEUTROPHILS ABSOLUTE: 10.9 THOU/MM3 (ref 1.8–7.7)
NEUTROPHILS NFR BLD AUTO: 82.8 %
NRBC BLD AUTO-RTO: 0 /100 WBC
OSMOLALITY SERPL CALC.SUM OF ELEC: 272.5 MOSMOL/KG (ref 275–300)
PLATELET # BLD AUTO: 259 THOU/MM3 (ref 130–400)
PMV BLD AUTO: 10.3 FL (ref 9.4–12.4)
POTASSIUM SERPL-SCNC: 4 MEQ/L (ref 3.5–5.2)
PROT SERPL-MCNC: 7.4 G/DL (ref 6.1–8)
RBC # BLD AUTO: 5.42 MILL/MM3 (ref 4.7–6.1)
SODIUM SERPL-SCNC: 135 MEQ/L (ref 135–145)
TROPONIN, HIGH SENSITIVITY: 8 NG/L (ref 0–12)
WBC # BLD AUTO: 13.2 THOU/MM3 (ref 4.8–10.8)

## 2024-06-15 PROCEDURE — 71045 X-RAY EXAM CHEST 1 VIEW: CPT

## 2024-06-15 PROCEDURE — 6360000002 HC RX W HCPCS: Performed by: STUDENT IN AN ORGANIZED HEALTH CARE EDUCATION/TRAINING PROGRAM

## 2024-06-15 PROCEDURE — 99285 EMERGENCY DEPT VISIT HI MDM: CPT

## 2024-06-15 PROCEDURE — 6360000004 HC RX CONTRAST MEDICATION: Performed by: EMERGENCY MEDICINE

## 2024-06-15 PROCEDURE — 83690 ASSAY OF LIPASE: CPT

## 2024-06-15 PROCEDURE — 2580000003 HC RX 258: Performed by: STUDENT IN AN ORGANIZED HEALTH CARE EDUCATION/TRAINING PROGRAM

## 2024-06-15 PROCEDURE — 96374 THER/PROPH/DIAG INJ IV PUSH: CPT

## 2024-06-15 PROCEDURE — 74177 CT ABD & PELVIS W/CONTRAST: CPT

## 2024-06-15 PROCEDURE — 6370000000 HC RX 637 (ALT 250 FOR IP): Performed by: STUDENT IN AN ORGANIZED HEALTH CARE EDUCATION/TRAINING PROGRAM

## 2024-06-15 PROCEDURE — 36415 COLL VENOUS BLD VENIPUNCTURE: CPT

## 2024-06-15 PROCEDURE — 84484 ASSAY OF TROPONIN QUANT: CPT

## 2024-06-15 PROCEDURE — 80053 COMPREHEN METABOLIC PANEL: CPT

## 2024-06-15 PROCEDURE — 93005 ELECTROCARDIOGRAM TRACING: CPT | Performed by: EMERGENCY MEDICINE

## 2024-06-15 PROCEDURE — 83605 ASSAY OF LACTIC ACID: CPT

## 2024-06-15 PROCEDURE — 85025 COMPLETE CBC W/AUTO DIFF WBC: CPT

## 2024-06-15 RX ORDER — 0.9 % SODIUM CHLORIDE 0.9 %
1000 INTRAVENOUS SOLUTION INTRAVENOUS ONCE
Status: COMPLETED | OUTPATIENT
Start: 2024-06-15 | End: 2024-06-15

## 2024-06-15 RX ORDER — ONDANSETRON 2 MG/ML
4 INJECTION INTRAMUSCULAR; INTRAVENOUS ONCE
Status: COMPLETED | OUTPATIENT
Start: 2024-06-15 | End: 2024-06-15

## 2024-06-15 RX ORDER — OMEPRAZOLE 40 MG/1
40 CAPSULE, DELAYED RELEASE ORAL
Qty: 30 CAPSULE | Refills: 0 | Status: SHIPPED | OUTPATIENT
Start: 2024-06-15

## 2024-06-15 RX ORDER — ASPIRIN 81 MG/1
324 TABLET, CHEWABLE ORAL ONCE
Status: COMPLETED | OUTPATIENT
Start: 2024-06-15 | End: 2024-06-15

## 2024-06-15 RX ORDER — FAMOTIDINE 20 MG/1
20 TABLET, FILM COATED ORAL ONCE
Status: COMPLETED | OUTPATIENT
Start: 2024-06-15 | End: 2024-06-15

## 2024-06-15 RX ORDER — ONDANSETRON 4 MG/1
4 TABLET, ORALLY DISINTEGRATING ORAL 3 TIMES DAILY PRN
Qty: 21 TABLET | Refills: 0 | Status: SHIPPED | OUTPATIENT
Start: 2024-06-15

## 2024-06-15 RX ADMIN — ASPIRIN 81 MG 324 MG: 81 TABLET ORAL at 17:07

## 2024-06-15 RX ADMIN — ALUMINUM HYDROXIDE, MAGNESIUM HYDROXIDE, AND SIMETHICONE: 1200; 120; 1200 SUSPENSION ORAL at 17:08

## 2024-06-15 RX ADMIN — IOPAMIDOL 80 ML: 755 INJECTION, SOLUTION INTRAVENOUS at 17:50

## 2024-06-15 RX ADMIN — FAMOTIDINE 20 MG: 20 TABLET, FILM COATED ORAL at 17:07

## 2024-06-15 RX ADMIN — SODIUM CHLORIDE 1000 ML: 9 INJECTION, SOLUTION INTRAVENOUS at 17:13

## 2024-06-15 RX ADMIN — ONDANSETRON 4 MG: 2 INJECTION INTRAMUSCULAR; INTRAVENOUS at 17:10

## 2024-06-15 NOTE — DISCHARGE INSTRUCTIONS
Call Gastroenterology on Monday to schedule an appointment next week    Return to the Emergency Department immediately if you are getting worse or if you develop any new or concerning symptoms.   This includes but is not limited to: worsening of the pain, high fevers, intractable vomiting, or bleeding

## 2024-06-15 NOTE — ED PROVIDER NOTES
or Acute Coronary Syndrome.  Instructions have been given for the patient to return to the ED for worsening of the pain, high fevers, intractable vomiting, or bleeding.  Strict follow up and return precautions have been discussed.  [TM]      ED Course User Index  [TM] Alex Mike MD     Vitals reviewed:  ED Triage Vitals   BP Temp Temp Source Pulse Respirations SpO2 Height Weight   06/15/24 1535 06/15/24 1535 06/15/24 1535 06/15/24 1605 06/15/24 1535 06/15/24 1535 -- --   (!) 128/94 98 °F (36.7 °C) Oral (!) 103 20 95 %         Differential diagnoses: Given the patient's above chief complaint and findings on history and physical examination, I thought it was appropriate to consider the following emergency medical conditions: ACS, biliary pathology, pancreatitis, AD, hepatitis, PUD, and others. Although, some of these diagnoses are unlikely they were considered in my medical decision making.          Summary of Patient Presentation (see ED course if left blank):            ED Medications administered this visit:  (None if blank)  Medications   sodium chloride 0.9 % bolus 1,000 mL (1,000 mLs IntraVENous New Bag 6/15/24 1713)   famotidine (PEPCID) tablet 20 mg (20 mg Oral Given 6/15/24 1707)   aluminum & magnesium hydroxide-simethicone (MAALOX) 30 mL, lidocaine viscous hcl (XYLOCAINE) 5 mL (GI COCKTAIL) ( Oral Given 6/15/24 1708)   aspirin chewable tablet 324 mg (324 mg Oral Given 6/15/24 1707)   ondansetron (ZOFRAN) injection 4 mg (4 mg IntraVENous Given 6/15/24 1710)   iopamidol (ISOVUE-370) 76 % injection 80 mL (80 mLs IntraVENous Given 6/15/24 1750)       PROCEDURES: (None if blank)  Procedures:     CRITICAL CARE: (Please see Attending note / Attestation regarding Critical Care Time. )        FINAL IMPRESSION      1. Abdominal pain, epigastric    2. Diverticulosis          DISPOSITION/PLAN       Condition: condition: stable  Dispo: Discharge to home  DISPOSITION Decision To Discharge 06/15/2024 06:32:44  PM          Outpatient follow up (If applicable):  Darnell Guevara MD  [address] [facility] [address]  [phone]    Schedule an appointment as soon as possible for a visit   for follow up    [facility] EMERGENCY DEPT  [address] [facility] [address]  [phone]  Go to   If symptoms worsen    The results of pertinent diagnostic studies and exam findings were discussed with the patient/surrogate. The patient’s provisional diagnosis and plan of care were discussed with the patient and present family who expressed understanding. Medications were reviewed and indications and risks of medications were discussed with the patient/family present. Strict return precautions and follow up instructions were discussed with the patient prior to discharge, with which the patient agrees.          DISCHARGE PRESCRIPTIONS: (None if blank)  New Prescriptions    OMEPRAZOLE (PRILOSEC) 40 MG DELAYED RELEASE CAPSULE    Take 1 capsule by mouth every morning (before breakfast)    ONDANSETRON (ZOFRAN-ODT) 4 MG DISINTEGRATING TABLET    Take 1 tablet by mouth 3 times daily as needed for Nausea or Vomiting         This transcription was electronically signed. Parts of this transcriptions may have been dictated by use of voice recognition software and electronically transcribed, and parts may have been transcribed with the assistance of an ED scribe. The transcription may contain errors not detected in proofreading.  Please refer to my supervising physician's documentation if my documentation differs.    Electronically Signed: Alex Mike MD, 06/15/24, 6:36 PM

## 2024-06-15 NOTE — ED NOTES
Pt denies needs for urine at this time. No acute distress noted. Pt sitting in chair at this time.

## 2024-06-15 NOTE — ED TRIAGE NOTES
Patient presents to the Emergency Department via self. Patient presents with a complaint of abdominal pain that radiates to the back. Patient states that he is not currently in pain but has vomited today due to symptoms. EKG obtained and IV inserted. RUQ pain noted by patient no pain upon palpation. Hypoactive bowel sounds noted in lower abdominal quadrant. Patient is alert and oriented x4, patient does not appear in acute distress upon triage. Patient respirations are regular and unlabored with even rise and fall of chest. Patient family at the bedside. Call light within reach.

## 2024-06-16 LAB
EKG ATRIAL RATE: 99 BPM
EKG P AXIS: 37 DEGREES
EKG P-R INTERVAL: 144 MS
EKG Q-T INTERVAL: 354 MS
EKG QRS DURATION: 80 MS
EKG QTC CALCULATION (BAZETT): 454 MS
EKG R AXIS: 26 DEGREES
EKG T AXIS: 44 DEGREES
EKG VENTRICULAR RATE: 99 BPM

## 2024-06-16 PROCEDURE — 93010 ELECTROCARDIOGRAM REPORT: CPT | Performed by: INTERNAL MEDICINE

## 2025-05-18 ENCOUNTER — APPOINTMENT (OUTPATIENT)
Dept: GENERAL RADIOLOGY | Age: 55
End: 2025-05-18
Payer: COMMERCIAL

## 2025-05-18 ENCOUNTER — HOSPITAL ENCOUNTER (EMERGENCY)
Age: 55
Discharge: HOME | End: 2025-05-19
Attending: FAMILY MEDICINE
Payer: COMMERCIAL

## 2025-05-18 VITALS
BODY MASS INDEX: 43.52 KG/M2 | WEIGHT: 304 LBS | HEIGHT: 70 IN | OXYGEN SATURATION: 98 % | DIASTOLIC BLOOD PRESSURE: 95 MMHG | RESPIRATION RATE: 18 BRPM | SYSTOLIC BLOOD PRESSURE: 128 MMHG | TEMPERATURE: 98.6 F | HEART RATE: 96 BPM

## 2025-05-18 DIAGNOSIS — L03.113 CELLULITIS OF RIGHT HAND: Primary | ICD-10-CM

## 2025-05-18 DIAGNOSIS — L03.115 CELLULITIS OF RIGHT ANKLE: ICD-10-CM

## 2025-05-18 PROCEDURE — 73600 X-RAY EXAM OF ANKLE: CPT

## 2025-05-18 PROCEDURE — 99283 EMERGENCY DEPT VISIT LOW MDM: CPT

## 2025-05-18 PROCEDURE — 6370000000 HC RX 637 (ALT 250 FOR IP): Performed by: FAMILY MEDICINE

## 2025-05-18 PROCEDURE — 73130 X-RAY EXAM OF HAND: CPT

## 2025-05-18 RX ORDER — SULFAMETHOXAZOLE AND TRIMETHOPRIM 800; 160 MG/1; MG/1
2 TABLET ORAL 2 TIMES DAILY
Qty: 28 TABLET | Refills: 0 | Status: SHIPPED | OUTPATIENT
Start: 2025-05-18 | End: 2025-05-25

## 2025-05-18 RX ORDER — SULFAMETHOXAZOLE AND TRIMETHOPRIM 800; 160 MG/1; MG/1
1 TABLET ORAL ONCE
Status: COMPLETED | OUTPATIENT
Start: 2025-05-18 | End: 2025-05-18

## 2025-05-18 RX ORDER — CEPHALEXIN 500 MG/1
500 CAPSULE ORAL 2 TIMES DAILY
Qty: 14 CAPSULE | Refills: 0 | Status: SHIPPED | OUTPATIENT
Start: 2025-05-18 | End: 2025-05-25

## 2025-05-18 RX ADMIN — SULFAMETHOXAZOLE AND TRIMETHOPRIM 1 TABLET: 800; 160 TABLET ORAL at 23:44

## 2025-05-18 RX ADMIN — CEPHALEXIN 500 MG: 250 CAPSULE ORAL at 23:43

## 2025-05-19 NOTE — ED TRIAGE NOTES
Pt presents to the ED through lobby with c/o hand and ankle pain. States he noticed redness and swelling in the right hand and also in his right ankle. No known injury

## 2025-05-19 NOTE — DISCHARGE INSTRUCTIONS
WE RECOMMEND THAT YOU ABSTAIN FROM USING THE LIDOCAINE PATCHES, WHICH MIGHT HAVE CAUSED A MILD ALLERGIC REACTION.    HOWEVER, TAKE BOTH BACTRIM AND KEFLEX FOR INFECTION CONTROL.    RETURN IF WORSE.

## 2025-05-19 NOTE — ED PROVIDER NOTES
EMERGENCY DEPARTMENT ENCOUNTER     CHIEF COMPLAINT   Chief Complaint   Patient presents with    Hand Pain     Right        Ankle Pain     Right          HPI   Cristhian Ramirez is a 54 y.o. male who presents with redness and swelling of right hand (back of hand) and right ankle, without bruising or bleeding, onset was last week. The duration has been constant since the onset. The patient has associated urticaria and pruritis, but denies fever. There are no alleviating factors. The context is that the symptoms started spontaneously, without any known precipitants.     Pt denies any anti-coagulant use or puncture/bites to affected extremities.    PAST MEDICAL & SURGICAL HISTORY   Past Medical History:   Diagnosis Date    Hypertension       No past surgical history on file.     CURRENT MEDICATIONS   Current Outpatient Rx   Medication Sig Dispense Refill    cephALEXin (KEFLEX) 500 MG capsule Take 1 capsule by mouth 2 times daily for 7 days 14 capsule 0    sulfamethoxazole-trimethoprim (BACTRIM DS) 800-160 MG per tablet Take 2 tablets by mouth 2 times daily for 7 days 28 tablet 0    lisinopril (PRINIVIL;ZESTRIL) 10 MG tablet Take 1 tablet by mouth daily      sodium-potassium-mag sulfate (SUPREP BOWEL PREP KIT) 17.5-3.13-1.6 GM/177ML SOLN solution Take 1 kit by mouth See Admin Instructions Use as directed 1 each 0    omeprazole (PRILOSEC) 40 MG delayed release capsule Take 1 capsule by mouth every morning (before breakfast) 30 capsule 0    ondansetron (ZOFRAN-ODT) 4 MG disintegrating tablet Take 1 tablet by mouth 3 times daily as needed for Nausea or Vomiting (Patient not taking: Reported on 7/15/2024) 21 tablet 0    traMADol (ULTRAM) 50 MG tablet Take 50 mg by mouth every 6 hours as needed for Pain (Patient not taking: Reported on 6/15/2024)      naproxen (NAPROSYN) 500 MG tablet Take 1 tablet by mouth 2 times daily Do not take with ibuprofen, advil, motrin, or aleve. (Patient not taking: Reported on 7/15/2024) 60 tablet